# Patient Record
Sex: FEMALE | Race: WHITE | NOT HISPANIC OR LATINO | URBAN - METROPOLITAN AREA
[De-identification: names, ages, dates, MRNs, and addresses within clinical notes are randomized per-mention and may not be internally consistent; named-entity substitution may affect disease eponyms.]

---

## 2017-03-20 ENCOUNTER — OUTPATIENT (OUTPATIENT)
Dept: OUTPATIENT SERVICES | Facility: HOSPITAL | Age: 77
LOS: 1 days | End: 2017-03-20
Payer: MEDICARE

## 2017-03-20 VITALS
TEMPERATURE: 99 F | HEIGHT: 64 IN | DIASTOLIC BLOOD PRESSURE: 82 MMHG | WEIGHT: 177.91 LBS | RESPIRATION RATE: 16 BRPM | HEART RATE: 69 BPM | SYSTOLIC BLOOD PRESSURE: 152 MMHG

## 2017-03-20 DIAGNOSIS — Z98.891 HISTORY OF UTERINE SCAR FROM PREVIOUS SURGERY: Chronic | ICD-10-CM

## 2017-03-20 DIAGNOSIS — M17.11 UNILATERAL PRIMARY OSTEOARTHRITIS, RIGHT KNEE: ICD-10-CM

## 2017-03-20 DIAGNOSIS — Z01.818 ENCOUNTER FOR OTHER PREPROCEDURAL EXAMINATION: ICD-10-CM

## 2017-03-20 DIAGNOSIS — Z98.890 OTHER SPECIFIED POSTPROCEDURAL STATES: Chronic | ICD-10-CM

## 2017-03-20 DIAGNOSIS — Z96.7 PRESENCE OF OTHER BONE AND TENDON IMPLANTS: Chronic | ICD-10-CM

## 2017-03-20 DIAGNOSIS — Z41.9 ENCOUNTER FOR PROCEDURE FOR PURPOSES OTHER THAN REMEDYING HEALTH STATE, UNSPECIFIED: Chronic | ICD-10-CM

## 2017-03-20 LAB
ALBUMIN SERPL ELPH-MCNC: 3.8 G/DL — SIGNIFICANT CHANGE UP (ref 3.3–5)
ALP SERPL-CCNC: 67 U/L — SIGNIFICANT CHANGE UP (ref 40–120)
ALT FLD-CCNC: 20 U/L — SIGNIFICANT CHANGE UP (ref 12–78)
ANION GAP SERPL CALC-SCNC: 10 MMOL/L — SIGNIFICANT CHANGE UP (ref 5–17)
APTT BLD: 32.8 SEC — SIGNIFICANT CHANGE UP (ref 27.5–37.4)
AST SERPL-CCNC: 15 U/L — SIGNIFICANT CHANGE UP (ref 15–37)
BILIRUB SERPL-MCNC: 0.5 MG/DL — SIGNIFICANT CHANGE UP (ref 0.2–1.2)
BUN SERPL-MCNC: 20 MG/DL — SIGNIFICANT CHANGE UP (ref 7–23)
CALCIUM SERPL-MCNC: 9.3 MG/DL — SIGNIFICANT CHANGE UP (ref 8.5–10.1)
CHLORIDE SERPL-SCNC: 102 MMOL/L — SIGNIFICANT CHANGE UP (ref 96–108)
CO2 SERPL-SCNC: 27 MMOL/L — SIGNIFICANT CHANGE UP (ref 22–31)
CREAT SERPL-MCNC: 0.75 MG/DL — SIGNIFICANT CHANGE UP (ref 0.5–1.3)
GLUCOSE SERPL-MCNC: 88 MG/DL — SIGNIFICANT CHANGE UP (ref 70–99)
HBA1C BLD-MCNC: 5.7 % — HIGH (ref 4–5.6)
HCT VFR BLD CALC: 39.7 % — SIGNIFICANT CHANGE UP (ref 34.5–45)
HGB BLD-MCNC: 13.6 G/DL — SIGNIFICANT CHANGE UP (ref 11.5–15.5)
INR BLD: 1.02 RATIO — SIGNIFICANT CHANGE UP (ref 0.88–1.16)
MCHC RBC-ENTMCNC: 31.3 PG — SIGNIFICANT CHANGE UP (ref 27–34)
MCHC RBC-ENTMCNC: 34.4 GM/DL — SIGNIFICANT CHANGE UP (ref 32–36)
MCV RBC AUTO: 90.9 FL — SIGNIFICANT CHANGE UP (ref 80–100)
PLATELET # BLD AUTO: 246 K/UL — SIGNIFICANT CHANGE UP (ref 150–400)
POTASSIUM SERPL-MCNC: 4 MMOL/L — SIGNIFICANT CHANGE UP (ref 3.5–5.3)
POTASSIUM SERPL-SCNC: 4 MMOL/L — SIGNIFICANT CHANGE UP (ref 3.5–5.3)
PROT SERPL-MCNC: 7.7 G/DL — SIGNIFICANT CHANGE UP (ref 6–8.3)
PROTHROM AB SERPL-ACNC: 11.3 SEC — SIGNIFICANT CHANGE UP (ref 10–13.1)
RBC # BLD: 4.36 M/UL — SIGNIFICANT CHANGE UP (ref 3.8–5.2)
RBC # FLD: 13.2 % — SIGNIFICANT CHANGE UP (ref 10.3–14.5)
SODIUM SERPL-SCNC: 139 MMOL/L — SIGNIFICANT CHANGE UP (ref 135–145)
WBC # BLD: 9.3 K/UL — SIGNIFICANT CHANGE UP (ref 3.8–10.5)
WBC # FLD AUTO: 9.3 K/UL — SIGNIFICANT CHANGE UP (ref 3.8–10.5)

## 2017-03-20 PROCEDURE — 93010 ELECTROCARDIOGRAM REPORT: CPT | Mod: NC

## 2017-03-20 PROCEDURE — 71020: CPT | Mod: 26

## 2017-03-20 PROCEDURE — 73564 X-RAY EXAM KNEE 4 OR MORE: CPT | Mod: 26,50

## 2017-03-20 NOTE — H&P PST ADULT - PMH
Hypertension    Unilateral primary osteoarthritis, right knee Anxiety and depression    Bilateral sciatica    Glaucoma    Herniated lumbar intervertebral disc    Hypertension    Low back pain    Unilateral primary osteoarthritis, right knee

## 2017-03-20 NOTE — H&P PST ADULT - HISTORY OF PRESENT ILLNESS
76 year old female presents for PST prior to RIGHT Total Knee Replacement with Dr Lyle Khan on 3/27/17 - pt notes 76 year old female presents for PST prior to RIGHT Total Knee Replacement with Dr Lyle Khan on 3/27/17 - (Pt notes she is then scheduled for LEFT Knee replacement the following week) - pt notes h/o arthritis in both knees which has been bothering her for a long time - pt notes she has had physical therapy - also notes has had the Gel Injections which helped for a while then pain came back - pt notes she has difficulty going up and down stairs, uses cane for ambulation assistance - notes decreased range of motion in knees and diminished strentgh - denies recent swelling - notes occasional use of Ibuprofen for pain relief - notes pain right now ia #2-4/10 on pain scale (Notes it used to be 12) - following discussions with Dr Khan pt is electing for scheduled procedure.

## 2017-03-20 NOTE — H&P PST ADULT - RS GEN PE MLT RESP DETAILS PC
airway patent/respirations non-labored/breath sounds equal/clear to auscultation bilaterally/good air movement

## 2017-03-20 NOTE — H&P PST ADULT - PROBLEM SELECTOR PLAN 1
PST Labs; CBC, CMP, Coags, Type & Screen, Nose Culture for MRSA/MSSA, EKG - medical clearance needed - pre-op instructions as well as pre-op wash instructions given to pt with understanding verbalized PST Labs; CBC, CMP, Coags, Type & Screen, Nose Culture for MRSA/MSSA, EKG - medical clearance needed - pre-op instructions as well as pre-op wash instructions given to pt with understanding verbalized    3/21/17@1850, Positive nose culture for MSSA received, Escribe sent to pt's pharmacy for mupirocin ointment. Spoke with pt and reinforced pt to use nasal ointment BID for 5 days ASAP. Pt verbalized understanding. (Ady Gao, RANJAN)

## 2017-03-20 NOTE — H&P PST ADULT - ASSESSMENT
76 year old female with Unilateral Primary Osteoarthritis, RIGHT Knee - scheduled for RIGHT Total Knee Replacement with Dr Khan on 3/27/17 -

## 2017-03-20 NOTE — H&P PST ADULT - REASON FOR ADMISSION
Pt states " Pt states " I am having a RIGHT Knee Replacement....(pt notes she is then scheduled for left knee replacement week later)

## 2017-03-20 NOTE — H&P PST ADULT - NEGATIVE ENMT SYMPTOMS
no nasal obstruction/no nasal congestion/no hearing difficulty/no nasal discharge/no sinus symptoms/no vertigo

## 2017-03-20 NOTE — H&P PST ADULT - PSH
Elective surgery  Notes "Tubular Reconstruction to be able to get pregnant"  H/O colonoscopy    H/O lumpectomy  Bilateral Breast Lumpectomies - notes marker in RIGHT breast  S/P  section    S/P ORIF (open reduction internal fixation) fracture   Gennaro Ankle (thinks Left) notes screws and a plate in place

## 2017-03-20 NOTE — H&P PST ADULT - NSANTHOSAYNRD_GEN_A_CORE
No. DOLORES screening performed.  STOP BANG Legend: 0-2 = LOW Risk; 3-4 = INTERMEDIATE Risk; 5-8 = HIGH Risk

## 2017-03-21 LAB
MRSA PCR RESULT.: SIGNIFICANT CHANGE UP
S AUREUS DNA NOSE QL NAA+PROBE: DETECTED

## 2017-03-21 RX ORDER — MUPIROCIN 20 MG/G
1 OINTMENT TOPICAL
Qty: 1 | Refills: 0 | OUTPATIENT
Start: 2017-03-21 | End: 2017-03-26

## 2017-03-22 RX ORDER — SODIUM CHLORIDE 9 MG/ML
1000 INJECTION, SOLUTION INTRAVENOUS
Qty: 0 | Refills: 0 | Status: DISCONTINUED | OUTPATIENT
Start: 2017-03-27 | End: 2017-03-27

## 2017-03-25 PROCEDURE — 73564 X-RAY EXAM KNEE 4 OR MORE: CPT

## 2017-03-25 PROCEDURE — 87641 MR-STAPH DNA AMP PROBE: CPT

## 2017-03-25 PROCEDURE — 86901 BLOOD TYPING SEROLOGIC RH(D): CPT

## 2017-03-25 PROCEDURE — 83036 HEMOGLOBIN GLYCOSYLATED A1C: CPT

## 2017-03-25 PROCEDURE — 93005 ELECTROCARDIOGRAM TRACING: CPT

## 2017-03-25 PROCEDURE — 85610 PROTHROMBIN TIME: CPT

## 2017-03-25 PROCEDURE — 86920 COMPATIBILITY TEST SPIN: CPT

## 2017-03-25 PROCEDURE — 80053 COMPREHEN METABOLIC PANEL: CPT

## 2017-03-25 PROCEDURE — G0463: CPT

## 2017-03-25 PROCEDURE — 86850 RBC ANTIBODY SCREEN: CPT

## 2017-03-25 PROCEDURE — 85730 THROMBOPLASTIN TIME PARTIAL: CPT

## 2017-03-25 PROCEDURE — 86900 BLOOD TYPING SEROLOGIC ABO: CPT

## 2017-03-25 PROCEDURE — 87640 STAPH A DNA AMP PROBE: CPT

## 2017-03-25 PROCEDURE — 85027 COMPLETE CBC AUTOMATED: CPT

## 2017-03-25 PROCEDURE — 71046 X-RAY EXAM CHEST 2 VIEWS: CPT

## 2017-03-26 ENCOUNTER — TRANSCRIPTION ENCOUNTER (OUTPATIENT)
Age: 77
End: 2017-03-26

## 2017-03-27 ENCOUNTER — INPATIENT (INPATIENT)
Facility: HOSPITAL | Age: 77
LOS: 9 days | Discharge: NURSING, REHAB OR LONG-TERM CARE | DRG: 462 | End: 2017-04-06
Attending: ORTHOPAEDIC SURGERY | Admitting: ORTHOPAEDIC SURGERY
Payer: MEDICARE

## 2017-03-27 VITALS
RESPIRATION RATE: 12 BRPM | HEIGHT: 64 IN | SYSTOLIC BLOOD PRESSURE: 128 MMHG | DIASTOLIC BLOOD PRESSURE: 70 MMHG | TEMPERATURE: 99 F | WEIGHT: 175.05 LBS | OXYGEN SATURATION: 100 % | HEART RATE: 59 BPM

## 2017-03-27 DIAGNOSIS — Z98.890 OTHER SPECIFIED POSTPROCEDURAL STATES: Chronic | ICD-10-CM

## 2017-03-27 DIAGNOSIS — Z98.891 HISTORY OF UTERINE SCAR FROM PREVIOUS SURGERY: Chronic | ICD-10-CM

## 2017-03-27 DIAGNOSIS — Z96.7 PRESENCE OF OTHER BONE AND TENDON IMPLANTS: Chronic | ICD-10-CM

## 2017-03-27 DIAGNOSIS — M17.11 UNILATERAL PRIMARY OSTEOARTHRITIS, RIGHT KNEE: ICD-10-CM

## 2017-03-27 DIAGNOSIS — Z41.9 ENCOUNTER FOR PROCEDURE FOR PURPOSES OTHER THAN REMEDYING HEALTH STATE, UNSPECIFIED: Chronic | ICD-10-CM

## 2017-03-27 LAB
ABO RH CONFIRMATION: SIGNIFICANT CHANGE UP
HCT VFR BLD CALC: 35.9 % — SIGNIFICANT CHANGE UP (ref 34.5–45)
HGB BLD-MCNC: 11.8 G/DL — SIGNIFICANT CHANGE UP (ref 11.5–15.5)

## 2017-03-27 PROCEDURE — 73562 X-RAY EXAM OF KNEE 3: CPT | Mod: 26,RT

## 2017-03-27 PROCEDURE — 88311 DECALCIFY TISSUE: CPT | Mod: 26

## 2017-03-27 PROCEDURE — 88305 TISSUE EXAM BY PATHOLOGIST: CPT | Mod: 26

## 2017-03-27 RX ORDER — CEFAZOLIN SODIUM 1 G
2000 VIAL (EA) INJECTION ONCE
Qty: 0 | Refills: 0 | Status: COMPLETED | OUTPATIENT
Start: 2017-03-27 | End: 2017-03-27

## 2017-03-27 RX ORDER — CEFAZOLIN SODIUM 1 G
1000 VIAL (EA) INJECTION EVERY 6 HOURS
Qty: 0 | Refills: 0 | Status: COMPLETED | OUTPATIENT
Start: 2017-03-27 | End: 2017-03-28

## 2017-03-27 RX ORDER — FERROUS SULFATE 325(65) MG
325 TABLET ORAL
Qty: 0 | Refills: 0 | Status: DISCONTINUED | OUTPATIENT
Start: 2017-03-27 | End: 2017-04-03

## 2017-03-27 RX ORDER — DORZOLAMIDE HYDROCHLORIDE TIMOLOL MALEATE 20; 5 MG/ML; MG/ML
1 SOLUTION/ DROPS OPHTHALMIC
Qty: 0 | Refills: 0 | COMMUNITY

## 2017-03-27 RX ORDER — HYDROMORPHONE HYDROCHLORIDE 2 MG/ML
0.5 INJECTION INTRAMUSCULAR; INTRAVENOUS; SUBCUTANEOUS
Qty: 0 | Refills: 0 | Status: DISCONTINUED | OUTPATIENT
Start: 2017-03-27 | End: 2017-03-27

## 2017-03-27 RX ORDER — DORZOLAMIDE HYDROCHLORIDE TIMOLOL MALEATE 20; 5 MG/ML; MG/ML
1 SOLUTION/ DROPS OPHTHALMIC
Qty: 0 | Refills: 0 | Status: DISCONTINUED | OUTPATIENT
Start: 2017-03-27 | End: 2017-04-03

## 2017-03-27 RX ORDER — SODIUM CHLORIDE 9 MG/ML
1000 INJECTION, SOLUTION INTRAVENOUS
Qty: 0 | Refills: 0 | Status: DISCONTINUED | OUTPATIENT
Start: 2017-03-27 | End: 2017-03-29

## 2017-03-27 RX ORDER — ASCORBIC ACID 60 MG
500 TABLET,CHEWABLE ORAL
Qty: 0 | Refills: 0 | Status: DISCONTINUED | OUTPATIENT
Start: 2017-03-27 | End: 2017-04-03

## 2017-03-27 RX ORDER — CELECOXIB 200 MG/1
200 CAPSULE ORAL
Qty: 0 | Refills: 0 | Status: DISCONTINUED | OUTPATIENT
Start: 2017-03-27 | End: 2017-04-03

## 2017-03-27 RX ORDER — SERTRALINE 25 MG/1
50 TABLET, FILM COATED ORAL DAILY
Qty: 0 | Refills: 0 | Status: DISCONTINUED | OUTPATIENT
Start: 2017-03-27 | End: 2017-04-03

## 2017-03-27 RX ORDER — CHOLECALCIFEROL (VITAMIN D3) 125 MCG
1 CAPSULE ORAL
Qty: 0 | Refills: 0 | COMMUNITY

## 2017-03-27 RX ORDER — ONDANSETRON 8 MG/1
4 TABLET, FILM COATED ORAL EVERY 6 HOURS
Qty: 0 | Refills: 0 | Status: DISCONTINUED | OUTPATIENT
Start: 2017-03-27 | End: 2017-04-03

## 2017-03-27 RX ORDER — FOLIC ACID 0.8 MG
1 TABLET ORAL DAILY
Qty: 0 | Refills: 0 | Status: DISCONTINUED | OUTPATIENT
Start: 2017-03-27 | End: 2017-04-03

## 2017-03-27 RX ORDER — ACETAMINOPHEN 500 MG
1000 TABLET ORAL ONCE
Qty: 0 | Refills: 0 | Status: COMPLETED | OUTPATIENT
Start: 2017-03-27 | End: 2017-03-27

## 2017-03-27 RX ORDER — ACETAMINOPHEN 500 MG
650 TABLET ORAL EVERY 8 HOURS
Qty: 0 | Refills: 0 | Status: COMPLETED | OUTPATIENT
Start: 2017-03-29 | End: 2017-03-31

## 2017-03-27 RX ORDER — LATANOPROST 0.05 MG/ML
1 SOLUTION/ DROPS OPHTHALMIC; TOPICAL AT BEDTIME
Qty: 0 | Refills: 0 | Status: DISCONTINUED | OUTPATIENT
Start: 2017-03-27 | End: 2017-04-03

## 2017-03-27 RX ORDER — HYDROMORPHONE HYDROCHLORIDE 2 MG/ML
2 INJECTION INTRAMUSCULAR; INTRAVENOUS; SUBCUTANEOUS EVERY 4 HOURS
Qty: 0 | Refills: 0 | Status: DISCONTINUED | OUTPATIENT
Start: 2017-03-27 | End: 2017-04-03

## 2017-03-27 RX ORDER — ENOXAPARIN SODIUM 100 MG/ML
40 INJECTION SUBCUTANEOUS DAILY
Qty: 0 | Refills: 0 | Status: COMPLETED | OUTPATIENT
Start: 2017-03-28 | End: 2017-04-02

## 2017-03-27 RX ORDER — SERTRALINE 25 MG/1
1 TABLET, FILM COATED ORAL
Qty: 0 | Refills: 0 | COMMUNITY

## 2017-03-27 RX ORDER — TRIAMTERENE/HYDROCHLOROTHIAZID 75 MG-50MG
1 TABLET ORAL DAILY
Qty: 0 | Refills: 0 | Status: DISCONTINUED | OUTPATIENT
Start: 2017-03-27 | End: 2017-03-27

## 2017-03-27 RX ORDER — MORPHINE SULFATE 50 MG/1
2 CAPSULE, EXTENDED RELEASE ORAL EVERY 4 HOURS
Qty: 0 | Refills: 0 | Status: DISCONTINUED | OUTPATIENT
Start: 2017-03-27 | End: 2017-04-03

## 2017-03-27 RX ORDER — SODIUM CHLORIDE 9 MG/ML
1000 INJECTION, SOLUTION INTRAVENOUS
Qty: 0 | Refills: 0 | Status: DISCONTINUED | OUTPATIENT
Start: 2017-03-27 | End: 2017-03-27

## 2017-03-27 RX ORDER — DOCUSATE SODIUM 100 MG
100 CAPSULE ORAL THREE TIMES A DAY
Qty: 0 | Refills: 0 | Status: DISCONTINUED | OUTPATIENT
Start: 2017-03-27 | End: 2017-04-03

## 2017-03-27 RX ORDER — BUPIVACAINE 13.3 MG/ML
20 INJECTION, SUSPENSION, LIPOSOMAL INFILTRATION ONCE
Qty: 0 | Refills: 0 | Status: COMPLETED | OUTPATIENT
Start: 2017-03-27 | End: 2017-03-27

## 2017-03-27 RX ORDER — BIMATOPROST 0.3 MG/ML
1 SOLUTION/ DROPS OPHTHALMIC
Qty: 0 | Refills: 0 | COMMUNITY

## 2017-03-27 RX ORDER — ONDANSETRON 8 MG/1
4 TABLET, FILM COATED ORAL ONCE
Qty: 0 | Refills: 0 | Status: DISCONTINUED | OUTPATIENT
Start: 2017-03-27 | End: 2017-03-27

## 2017-03-27 RX ORDER — OXYCODONE HYDROCHLORIDE 5 MG/1
10 TABLET ORAL EVERY 6 HOURS
Qty: 0 | Refills: 0 | Status: DISCONTINUED | OUTPATIENT
Start: 2017-03-27 | End: 2017-03-27

## 2017-03-27 RX ORDER — HYDROMORPHONE HYDROCHLORIDE 2 MG/ML
4 INJECTION INTRAMUSCULAR; INTRAVENOUS; SUBCUTANEOUS EVERY 4 HOURS
Qty: 0 | Refills: 0 | Status: DISCONTINUED | OUTPATIENT
Start: 2017-03-27 | End: 2017-04-03

## 2017-03-27 RX ADMIN — CELECOXIB 200 MILLIGRAM(S): 200 CAPSULE ORAL at 17:43

## 2017-03-27 RX ADMIN — Medication 100 MILLIGRAM(S): at 22:24

## 2017-03-27 RX ADMIN — MORPHINE SULFATE 2 MILLIGRAM(S): 50 CAPSULE, EXTENDED RELEASE ORAL at 22:00

## 2017-03-27 RX ADMIN — HYDROMORPHONE HYDROCHLORIDE 2 MILLIGRAM(S): 2 INJECTION INTRAMUSCULAR; INTRAVENOUS; SUBCUTANEOUS at 22:08

## 2017-03-27 RX ADMIN — Medication 100 MILLIGRAM(S): at 16:27

## 2017-03-27 RX ADMIN — CELECOXIB 200 MILLIGRAM(S): 200 CAPSULE ORAL at 17:37

## 2017-03-27 RX ADMIN — Medication 1000 MILLIGRAM(S): at 17:43

## 2017-03-27 RX ADMIN — HYDROMORPHONE HYDROCHLORIDE 2 MILLIGRAM(S): 2 INJECTION INTRAMUSCULAR; INTRAVENOUS; SUBCUTANEOUS at 21:36

## 2017-03-27 RX ADMIN — HYDROMORPHONE HYDROCHLORIDE 0.5 MILLIGRAM(S): 2 INJECTION INTRAMUSCULAR; INTRAVENOUS; SUBCUTANEOUS at 13:17

## 2017-03-27 RX ADMIN — DORZOLAMIDE HYDROCHLORIDE TIMOLOL MALEATE 1 DROP(S): 20; 5 SOLUTION/ DROPS OPHTHALMIC at 17:37

## 2017-03-27 RX ADMIN — LATANOPROST 1 DROP(S): 0.05 SOLUTION/ DROPS OPHTHALMIC; TOPICAL at 21:29

## 2017-03-27 RX ADMIN — Medication 500 MILLIGRAM(S): at 17:37

## 2017-03-27 RX ADMIN — Medication 325 MILLIGRAM(S): at 17:37

## 2017-03-27 RX ADMIN — SODIUM CHLORIDE 75 MILLILITER(S): 9 INJECTION, SOLUTION INTRAVENOUS at 13:07

## 2017-03-27 RX ADMIN — Medication 400 MILLIGRAM(S): at 17:37

## 2017-03-27 RX ADMIN — HYDROMORPHONE HYDROCHLORIDE 0.5 MILLIGRAM(S): 2 INJECTION INTRAMUSCULAR; INTRAVENOUS; SUBCUTANEOUS at 13:23

## 2017-03-27 RX ADMIN — SODIUM CHLORIDE 75 MILLILITER(S): 9 INJECTION, SOLUTION INTRAVENOUS at 08:13

## 2017-03-27 RX ADMIN — Medication 100 MILLIGRAM(S): at 21:29

## 2017-03-27 RX ADMIN — HYDROMORPHONE HYDROCHLORIDE 0.5 MILLIGRAM(S): 2 INJECTION INTRAMUSCULAR; INTRAVENOUS; SUBCUTANEOUS at 13:07

## 2017-03-27 NOTE — PATIENT PROFILE ADULT. - PMH
Anxiety and depression    Bilateral sciatica    Glaucoma    Herniated lumbar intervertebral disc    Hypertension    Low back pain    Unilateral primary osteoarthritis, right knee

## 2017-03-27 NOTE — PATIENT PROFILE ADULT. - FAMILY HISTORY
Mother  Still living? No  Family history of breast cancer in mother, Age at diagnosis: Age Unknown  Hypertension, Age at diagnosis: Age Unknown     Father  Still living? No  Family history of stroke, Age at diagnosis: Age Unknown

## 2017-03-27 NOTE — PATIENT PROFILE ADULT. - REASON FOR ADMISSION
Pt states " I am having a RIGHT Knee Replacement....(pt notes she is then scheduled for left knee replacement week later)

## 2017-03-27 NOTE — PATIENT PROFILE ADULT. - VISION (WITH CORRECTIVE LENSES IF THE PATIENT USUALLY WEARS THEM):
Wears RX Eyeglasses/Partially impaired: cannot see medication labels or newsprint, but can see obstacles in path, and the surrounding layout; can count fingers at arm's length

## 2017-03-28 LAB
ANION GAP SERPL CALC-SCNC: 9 MMOL/L — SIGNIFICANT CHANGE UP (ref 5–17)
BUN SERPL-MCNC: 17 MG/DL — SIGNIFICANT CHANGE UP (ref 7–23)
CALCIUM SERPL-MCNC: 9.1 MG/DL — SIGNIFICANT CHANGE UP (ref 8.5–10.1)
CHLORIDE SERPL-SCNC: 102 MMOL/L — SIGNIFICANT CHANGE UP (ref 96–108)
CO2 SERPL-SCNC: 30 MMOL/L — SIGNIFICANT CHANGE UP (ref 22–31)
CREAT SERPL-MCNC: 0.88 MG/DL — SIGNIFICANT CHANGE UP (ref 0.5–1.3)
GLUCOSE SERPL-MCNC: 114 MG/DL — HIGH (ref 70–99)
HCT VFR BLD CALC: 34.8 % — SIGNIFICANT CHANGE UP (ref 34.5–45)
HGB BLD-MCNC: 11.4 G/DL — LOW (ref 11.5–15.5)
POTASSIUM SERPL-MCNC: 3.5 MMOL/L — SIGNIFICANT CHANGE UP (ref 3.5–5.3)
POTASSIUM SERPL-SCNC: 3.5 MMOL/L — SIGNIFICANT CHANGE UP (ref 3.5–5.3)
SODIUM SERPL-SCNC: 141 MMOL/L — SIGNIFICANT CHANGE UP (ref 135–145)

## 2017-03-28 RX ADMIN — LATANOPROST 1 DROP(S): 0.05 SOLUTION/ DROPS OPHTHALMIC; TOPICAL at 21:41

## 2017-03-28 RX ADMIN — Medication 100 MILLIGRAM(S): at 05:20

## 2017-03-28 RX ADMIN — HYDROMORPHONE HYDROCHLORIDE 4 MILLIGRAM(S): 2 INJECTION INTRAMUSCULAR; INTRAVENOUS; SUBCUTANEOUS at 15:31

## 2017-03-28 RX ADMIN — ENOXAPARIN SODIUM 40 MILLIGRAM(S): 100 INJECTION SUBCUTANEOUS at 12:53

## 2017-03-28 RX ADMIN — Medication 325 MILLIGRAM(S): at 09:14

## 2017-03-28 RX ADMIN — MORPHINE SULFATE 2 MILLIGRAM(S): 50 CAPSULE, EXTENDED RELEASE ORAL at 21:42

## 2017-03-28 RX ADMIN — HYDROMORPHONE HYDROCHLORIDE 4 MILLIGRAM(S): 2 INJECTION INTRAMUSCULAR; INTRAVENOUS; SUBCUTANEOUS at 11:01

## 2017-03-28 RX ADMIN — CELECOXIB 200 MILLIGRAM(S): 200 CAPSULE ORAL at 09:14

## 2017-03-28 RX ADMIN — Medication 100 MILLIGRAM(S): at 15:01

## 2017-03-28 RX ADMIN — Medication 325 MILLIGRAM(S): at 12:53

## 2017-03-28 RX ADMIN — CELECOXIB 200 MILLIGRAM(S): 200 CAPSULE ORAL at 18:19

## 2017-03-28 RX ADMIN — HYDROMORPHONE HYDROCHLORIDE 4 MILLIGRAM(S): 2 INJECTION INTRAMUSCULAR; INTRAVENOUS; SUBCUTANEOUS at 10:31

## 2017-03-28 RX ADMIN — HYDROMORPHONE HYDROCHLORIDE 2 MILLIGRAM(S): 2 INJECTION INTRAMUSCULAR; INTRAVENOUS; SUBCUTANEOUS at 04:44

## 2017-03-28 RX ADMIN — DORZOLAMIDE HYDROCHLORIDE TIMOLOL MALEATE 1 DROP(S): 20; 5 SOLUTION/ DROPS OPHTHALMIC at 18:19

## 2017-03-28 RX ADMIN — CELECOXIB 200 MILLIGRAM(S): 200 CAPSULE ORAL at 18:24

## 2017-03-28 RX ADMIN — Medication 500 MILLIGRAM(S): at 05:20

## 2017-03-28 RX ADMIN — HYDROMORPHONE HYDROCHLORIDE 4 MILLIGRAM(S): 2 INJECTION INTRAMUSCULAR; INTRAVENOUS; SUBCUTANEOUS at 19:04

## 2017-03-28 RX ADMIN — Medication 100 MILLIGRAM(S): at 21:42

## 2017-03-28 RX ADMIN — HYDROMORPHONE HYDROCHLORIDE 4 MILLIGRAM(S): 2 INJECTION INTRAMUSCULAR; INTRAVENOUS; SUBCUTANEOUS at 19:25

## 2017-03-28 RX ADMIN — Medication 1 TABLET(S): at 12:53

## 2017-03-28 RX ADMIN — HYDROMORPHONE HYDROCHLORIDE 4 MILLIGRAM(S): 2 INJECTION INTRAMUSCULAR; INTRAVENOUS; SUBCUTANEOUS at 15:01

## 2017-03-28 RX ADMIN — Medication 325 MILLIGRAM(S): at 18:19

## 2017-03-28 RX ADMIN — Medication 100 MILLIGRAM(S): at 04:13

## 2017-03-28 RX ADMIN — DORZOLAMIDE HYDROCHLORIDE TIMOLOL MALEATE 1 DROP(S): 20; 5 SOLUTION/ DROPS OPHTHALMIC at 05:20

## 2017-03-28 RX ADMIN — SERTRALINE 50 MILLIGRAM(S): 25 TABLET, FILM COATED ORAL at 21:42

## 2017-03-28 RX ADMIN — Medication 1 MILLIGRAM(S): at 12:53

## 2017-03-28 RX ADMIN — Medication 500 MILLIGRAM(S): at 18:19

## 2017-03-28 RX ADMIN — CELECOXIB 200 MILLIGRAM(S): 200 CAPSULE ORAL at 09:44

## 2017-03-29 DIAGNOSIS — F41.9 ANXIETY DISORDER, UNSPECIFIED: ICD-10-CM

## 2017-03-29 DIAGNOSIS — I95.9 HYPOTENSION, UNSPECIFIED: ICD-10-CM

## 2017-03-29 DIAGNOSIS — D50.0 IRON DEFICIENCY ANEMIA SECONDARY TO BLOOD LOSS (CHRONIC): ICD-10-CM

## 2017-03-29 DIAGNOSIS — Z71.89 OTHER SPECIFIED COUNSELING: ICD-10-CM

## 2017-03-29 LAB
HCT VFR BLD CALC: 33.1 % — LOW (ref 34.5–45)
HGB BLD-MCNC: 10.6 G/DL — LOW (ref 11.5–15.5)
SURGICAL PATHOLOGY FINAL REPORT - CH: SIGNIFICANT CHANGE UP

## 2017-03-29 RX ADMIN — SERTRALINE 50 MILLIGRAM(S): 25 TABLET, FILM COATED ORAL at 12:13

## 2017-03-29 RX ADMIN — Medication 650 MILLIGRAM(S): at 05:54

## 2017-03-29 RX ADMIN — Medication 500 MILLIGRAM(S): at 18:05

## 2017-03-29 RX ADMIN — Medication 1 MILLIGRAM(S): at 12:13

## 2017-03-29 RX ADMIN — LATANOPROST 1 DROP(S): 0.05 SOLUTION/ DROPS OPHTHALMIC; TOPICAL at 21:19

## 2017-03-29 RX ADMIN — MORPHINE SULFATE 2 MILLIGRAM(S): 50 CAPSULE, EXTENDED RELEASE ORAL at 02:12

## 2017-03-29 RX ADMIN — Medication 500 MILLIGRAM(S): at 20:19

## 2017-03-29 RX ADMIN — HYDROMORPHONE HYDROCHLORIDE 4 MILLIGRAM(S): 2 INJECTION INTRAMUSCULAR; INTRAVENOUS; SUBCUTANEOUS at 05:24

## 2017-03-29 RX ADMIN — CELECOXIB 200 MILLIGRAM(S): 200 CAPSULE ORAL at 18:04

## 2017-03-29 RX ADMIN — CELECOXIB 200 MILLIGRAM(S): 200 CAPSULE ORAL at 08:56

## 2017-03-29 RX ADMIN — Medication 1 TABLET(S): at 12:13

## 2017-03-29 RX ADMIN — Medication 650 MILLIGRAM(S): at 05:24

## 2017-03-29 RX ADMIN — MORPHINE SULFATE 2 MILLIGRAM(S): 50 CAPSULE, EXTENDED RELEASE ORAL at 09:47

## 2017-03-29 RX ADMIN — MORPHINE SULFATE 2 MILLIGRAM(S): 50 CAPSULE, EXTENDED RELEASE ORAL at 01:42

## 2017-03-29 RX ADMIN — Medication 650 MILLIGRAM(S): at 21:19

## 2017-03-29 RX ADMIN — ENOXAPARIN SODIUM 40 MILLIGRAM(S): 100 INJECTION SUBCUTANEOUS at 12:13

## 2017-03-29 RX ADMIN — Medication 650 MILLIGRAM(S): at 13:08

## 2017-03-29 RX ADMIN — Medication 100 MILLIGRAM(S): at 21:19

## 2017-03-29 RX ADMIN — Medication 100 MILLIGRAM(S): at 13:08

## 2017-03-29 RX ADMIN — Medication 100 MILLIGRAM(S): at 05:24

## 2017-03-29 RX ADMIN — Medication 325 MILLIGRAM(S): at 18:07

## 2017-03-29 RX ADMIN — HYDROMORPHONE HYDROCHLORIDE 4 MILLIGRAM(S): 2 INJECTION INTRAMUSCULAR; INTRAVENOUS; SUBCUTANEOUS at 05:50

## 2017-03-29 RX ADMIN — DORZOLAMIDE HYDROCHLORIDE TIMOLOL MALEATE 1 DROP(S): 20; 5 SOLUTION/ DROPS OPHTHALMIC at 05:23

## 2017-03-29 RX ADMIN — DORZOLAMIDE HYDROCHLORIDE TIMOLOL MALEATE 1 DROP(S): 20; 5 SOLUTION/ DROPS OPHTHALMIC at 18:05

## 2017-03-29 NOTE — PROGRESS NOTE ADULT - SUBJECTIVE AND OBJECTIVE BOX
Patient is a 76y old  Female who presents with a chief complaint of Pt states " I am having a RIGHT Knee Replacement....(pt notes she is then scheduled for left knee replacement week later) (27 Mar 2017 07:36)      INTERVAL HPI/OVERNIGHT EVENTS: no complaints    MEDICATIONS  (STANDING):  acetaminophen   Tablet. 650milliGRAM(s) Oral every 8 hours  celecoxib 200milliGRAM(s) Oral two times a day after meals  docusate sodium 100milliGRAM(s) Oral three times a day  ferrous    sulfate 325milliGRAM(s) Oral three times a day with meals  folic acid 1milliGRAM(s) Oral daily  multivitamin 1Tablet(s) Oral daily  ascorbic acid 500milliGRAM(s) Oral two times a day  enoxaparin Injectable 40milliGRAM(s) SubCutaneous daily  sertraline 50milliGRAM(s) Oral daily  dorzolamide 2%/timolol 0.5% Ophthalmic Solution 1Drop(s) Both EYES two times a day  latanoprost 0.005% Ophthalmic Solution 1Drop(s) Both EYES at bedtime    MEDICATIONS  (PRN):  ondansetron Injectable 4milliGRAM(s) IV Push every 6 hours PRN Nausea and/or Vomiting  HYDROmorphone   Tablet 2milliGRAM(s) Oral every 4 hours PRN Moderate Pain (4 - 6)  HYDROmorphone   Tablet 4milliGRAM(s) Oral every 4 hours PRN Severe Pain (7 - 10)  morphine  - Injectable 2milliGRAM(s) IV Push every 4 hours PRN Severe Pain (7 - 10)      Allergies    latex (Rash)  No Known Drug Allergies    Intolerances        REVIEW OF SYSTEMS:  CONSTITUTIONAL: No fever, No chills,No fatigue,No myalgia,No Body ache  EYES: No eye pain, visual disturbances, or discharge  ENMT:  No ear pain, No nose bleed, No vertigo; No sinus or throat pain  NECK: No pain, No stiffness  RESPIRATORY: No cough, wheezing, No  hemoptysis; No shortness of breath  CARDIOVASCULAR: No chest pain, palpitations, leg swelling  GASTROINTESTINAL: No abdominal or epigastric pain. No nausea, No vomiting; No diarrhea or constipation. [ ] BM  GENITOURINARY: No dysuria, No frequency, No urgency, No hematuria, or incontinence  NEUROLOGICAL: alert and oriented x 3,  No headaches, No dizziness, No numbness,  SKIN:   No itching, burning, rashes, or lesions   MUSCULOSKELETAL: No joint pain or swelling; No muscle pain, No back pain, No extremity pain  PSYCHIATRIC: No depression, anxiety, mood swings, or difficulty sleeping  ROS  [ ] Unable to obtain   REST OF REVIEW Of SYSTEM - [ ] Normal     Height (cm): 162.6 (03-27 @ 07:53), 162.6 (03-20 @ 14:14)  Weight (kg): 79.4 (03-27 @ 07:53), 80.7 (03-20 @ 14:14)  BMI (kg/m2): 30 (03-27 @ 07:53), 30.5 (03-20 @ 14:14)  BSA (m2): 1.85 (03-27 @ 07:53), 1.86 (03-20 @ 14:14)  Vital Signs Last 24 Hrs  T(C): 36.7, Max: 37.1 (03-28 @ 21:05)  T(F): 98, Max: 98.8 (03-28 @ 21:05)  HR: 68 (62 - 70)  BP: 106/60 (95/61 - 118/70)  BP(mean): --  RR: 16 (16 - 16)  SpO2: 99% (95% - 99%)  [ ] room air   [ ] 02    PHYSICAL EXAM:  GENERAL:  No acute distresss, well appearing, [ ] Agitated, [ ] Lethargy, [ ] confused   HEAD:  normal  ENMT: normal  NECK:  normal    NERVOUS SYSTEM:  Alert & Oriented X3, no focal deficits [ ]Confusion  [ ] Encephalopathic [ ] Sedated [ ] Other  CHEST/LUNG: Clear to auscultation bilaterally,  [ ] decreased breath sounds at bases  [ ] wheezing   [ ] rhonchi  [ ] crackles  HEART:  Regular rate and rhythm, No murmurs, rubs, or gallops,  [ ] irregular   ABDOMEN:  soft, nontender, nondistended, positive bowel sounds   [ ] obese  EXTREMITIES: No clubbing, cyanosis or edema  SKIN: [ ] venous stasis skin changes    LABS:                        10.6   x     )-----------( x        ( 29 Mar 2017 08:01 )             33.1       Ca    9.1        28 Mar 2017 08:45        Hemoglobin A1C, Whole Blood: 5.7 % (03-20 @ 20:45)      CAPILLARY BLOOD GLUCOSE    Cultures          RADIOLOGY & ADDITIONAL TESTS:      Care Discussed with [X] Consultants  [ ] Patient  [ ] Family  [X]   /   [ ] Other; RN  DVT prophylaxis [ ] lovenox   [ ] subq heparin  [ ] coumadin  [ ] venodynes [ ] ambulating frequently at how risk for vte and no pharm         or  mechanical prophylaxis required    [ ] other   Advanced directive:    [ ]pt has hcp     [ ] pt declined to assign hcp  Discussed with pt @ bedside Patient is a 76y old  Female who presents with a chief complaint of Pt states " I am having a RIGHT Knee Replacement....(pt notes she is then scheduled for left knee replacement week later) (27 Mar 2017 07:36)      INTERVAL HPI/OVERNIGHT EVENTS: no complaints    MEDICATIONS  (STANDING):  acetaminophen   Tablet. 650milliGRAM(s) Oral every 8 hours  celecoxib 200milliGRAM(s) Oral two times a day after meals  docusate sodium 100milliGRAM(s) Oral three times a day  ferrous    sulfate 325milliGRAM(s) Oral three times a day with meals  folic acid 1milliGRAM(s) Oral daily  multivitamin 1Tablet(s) Oral daily  ascorbic acid 500milliGRAM(s) Oral two times a day  enoxaparin Injectable 40milliGRAM(s) SubCutaneous daily  sertraline 50milliGRAM(s) Oral daily  dorzolamide 2%/timolol 0.5% Ophthalmic Solution 1Drop(s) Both EYES two times a day  latanoprost 0.005% Ophthalmic Solution 1Drop(s) Both EYES at bedtime    MEDICATIONS  (PRN):  ondansetron Injectable 4milliGRAM(s) IV Push every 6 hours PRN Nausea and/or Vomiting  HYDROmorphone   Tablet 2milliGRAM(s) Oral every 4 hours PRN Moderate Pain (4 - 6)  HYDROmorphone   Tablet 4milliGRAM(s) Oral every 4 hours PRN Severe Pain (7 - 10)  morphine  - Injectable 2milliGRAM(s) IV Push every 4 hours PRN Severe Pain (7 - 10)      Allergies    latex (Rash)        REVIEW OF SYSTEMS:  CONSTITUTIONAL: No fever, No chills,No fatigue,No myalgia,No Body ache  EYES: No eye pain, visual disturbances, or discharge  ENMT:  No ear pain, No nose bleed, No vertigo; No sinus or throat pain  NECK: No pain, No stiffness  RESPIRATORY: No cough, wheezing, No  hemoptysis; No shortness of breath  CARDIOVASCULAR: No chest pain, palpitations, leg swelling  GASTROINTESTINAL: No abdominal or epigastric pain. No nausea, No vomiting; No diarrhea or constipation. [ ] BM  GENITOURINARY: No dysuria, No frequency, No urgency, No hematuria, or incontinence  NEUROLOGICAL: alert and oriented x 3,  No headaches, No dizziness, No numbness,  SKIN:   No itching, burning, rashes, or lesions   MUSCULOSKELETAL: No joint pain or swelling; No muscle pain, No back pain, No extremity pain  PSYCHIATRIC: No depression, anxiety, mood swings, or difficulty sleeping  ROS  [ ] Unable to obtain   REST OF REVIEW Of SYSTEM - [x ] Normal     Height (cm): 162.6 (03-27 @ 07:53), 162.6 (03-20 @ 14:14)  Weight (kg): 79.4 (03-27 @ 07:53), 80.7 (03-20 @ 14:14)  BMI (kg/m2): 30 (03-27 @ 07:53), 30.5 (03-20 @ 14:14)  BSA (m2): 1.85 (03-27 @ 07:53), 1.86 (03-20 @ 14:14)  Vital Signs Last 24 Hrs  T(C): 36.7, Max: 37.1 (03-28 @ 21:05)  T(F): 98, Max: 98.8 (03-28 @ 21:05)  HR: 68 (62 - 70)  BP: 106/60 (95/61 - 118/70)  BP(mean): --  RR: 16 (16 - 16)  SpO2: 99% (95% - 99%)  [ ] room air   [ ] 02    PHYSICAL EXAM:  GENERAL:  No acute distresss, well appearing, [ ] Agitated, [ ] Lethargy, [ ] confused   HEAD:  normal  ENMT: normal  NECK:  normal    NERVOUS SYSTEM:  Alert & Oriented X3, no focal deficits [ ]Confusion  [ ] Encephalopathic [ ] Sedated [ ] Other  CHEST/LUNG: Clear to auscultation bilaterally,  [ ] decreased breath sounds at bases  [ ] wheezing   [ ] rhonchi  [ ] crackles  HEART:  Regular rate and rhythm, No murmurs, rubs, or gallops,  [ ] irregular   ABDOMEN:  soft, nontender, nondistended, positive bowel sounds   [ ] obese  EXTREMITIES: No clubbing, cyanosis or edema  SKIN: [ ] venous stasis skin changes    LABS:                        10.6   x     )-----------( x        ( 29 Mar 2017 08:01 )             33.1       Ca    9.1        28 Mar 2017 08:45        Hemoglobin A1C, Whole Blood: 5.7 % (03-20 @ 20:45)      CAPILLARY BLOOD GLUCOSE    Cultures          RADIOLOGY & ADDITIONAL TESTS:      Care Discussed with [X] Consultants  [ ] Patient  [ ] Family  [X]   /   [ ] Other; RN  DVT prophylaxis [ ] lovenox   [ ] subq heparin  [ ] coumadin  [ ] venodynes [ ] ambulating frequently at how risk for vte and no pharm         or  mechanical prophylaxis required    [ ] other   Advanced directive:    [ ]pt has hcp     [ ] pt declined to assign hcp  Discussed with pt @ bedside

## 2017-03-30 DIAGNOSIS — I10 ESSENTIAL (PRIMARY) HYPERTENSION: ICD-10-CM

## 2017-03-30 RX ADMIN — Medication 1 MILLIGRAM(S): at 11:26

## 2017-03-30 RX ADMIN — Medication 325 MILLIGRAM(S): at 08:17

## 2017-03-30 RX ADMIN — CELECOXIB 200 MILLIGRAM(S): 200 CAPSULE ORAL at 17:46

## 2017-03-30 RX ADMIN — HYDROMORPHONE HYDROCHLORIDE 4 MILLIGRAM(S): 2 INJECTION INTRAMUSCULAR; INTRAVENOUS; SUBCUTANEOUS at 09:29

## 2017-03-30 RX ADMIN — Medication 650 MILLIGRAM(S): at 23:59

## 2017-03-30 RX ADMIN — Medication 1 TABLET(S): at 11:26

## 2017-03-30 RX ADMIN — Medication 650 MILLIGRAM(S): at 05:23

## 2017-03-30 RX ADMIN — Medication 100 MILLIGRAM(S): at 13:27

## 2017-03-30 RX ADMIN — Medication 650 MILLIGRAM(S): at 22:59

## 2017-03-30 RX ADMIN — Medication 325 MILLIGRAM(S): at 17:46

## 2017-03-30 RX ADMIN — Medication 500 MILLIGRAM(S): at 05:23

## 2017-03-30 RX ADMIN — SERTRALINE 50 MILLIGRAM(S): 25 TABLET, FILM COATED ORAL at 11:26

## 2017-03-30 RX ADMIN — ENOXAPARIN SODIUM 40 MILLIGRAM(S): 100 INJECTION SUBCUTANEOUS at 11:26

## 2017-03-30 RX ADMIN — CELECOXIB 200 MILLIGRAM(S): 200 CAPSULE ORAL at 08:17

## 2017-03-30 RX ADMIN — LATANOPROST 1 DROP(S): 0.05 SOLUTION/ DROPS OPHTHALMIC; TOPICAL at 22:59

## 2017-03-30 RX ADMIN — DORZOLAMIDE HYDROCHLORIDE TIMOLOL MALEATE 1 DROP(S): 20; 5 SOLUTION/ DROPS OPHTHALMIC at 17:46

## 2017-03-30 RX ADMIN — Medication 650 MILLIGRAM(S): at 13:27

## 2017-03-30 RX ADMIN — Medication 100 MILLIGRAM(S): at 22:59

## 2017-03-30 RX ADMIN — Medication 100 MILLIGRAM(S): at 05:23

## 2017-03-30 RX ADMIN — Medication 325 MILLIGRAM(S): at 11:56

## 2017-03-30 RX ADMIN — Medication 500 MILLIGRAM(S): at 17:46

## 2017-03-30 RX ADMIN — DORZOLAMIDE HYDROCHLORIDE TIMOLOL MALEATE 1 DROP(S): 20; 5 SOLUTION/ DROPS OPHTHALMIC at 05:23

## 2017-03-30 NOTE — PROGRESS NOTE ADULT - SUBJECTIVE AND OBJECTIVE BOX
Patient is a 76y old  Female who presents with a chief complaint of Pt states " I am having a RIGHT Knee Replacement....(pt notes she is then scheduled for left knee replacement week later) (27 Mar 2017 07:36)      INTERVAL HPI/OVERNIGHT EVENTS:  T(C): 36.4, Max: 36.9 (03-29 @ 21:10)  HR: 62 (62 - 71)  BP: 121/78 (94/60 - 121/78)  RR: 16 (16 - 16)  SpO2: 100% (97% - 100%)  Wt(kg): --  I&O's Summary      LABS:                        10.6   x     )-----------( x        ( 29 Mar 2017 08:01 )             33.1          MEDICATIONS  (STANDING):  acetaminophen   Tablet. 650milliGRAM(s) Oral every 8 hours  celecoxib 200milliGRAM(s) Oral two times a day after meals  docusate sodium 100milliGRAM(s) Oral three times a day  ferrous    sulfate 325milliGRAM(s) Oral three times a day with meals  folic acid 1milliGRAM(s) Oral daily  multivitamin 1Tablet(s) Oral daily  ascorbic acid 500milliGRAM(s) Oral two times a day  enoxaparin Injectable 40milliGRAM(s) SubCutaneous daily  sertraline 50milliGRAM(s) Oral daily  dorzolamide 2%/timolol 0.5% Ophthalmic Solution 1Drop(s) Both EYES two times a day  latanoprost 0.005% Ophthalmic Solution 1Drop(s) Both EYES at bedtime    MEDICATIONS  (PRN):  ondansetron Injectable 4milliGRAM(s) IV Push every 6 hours PRN Nausea and/or Vomiting  HYDROmorphone   Tablet 2milliGRAM(s) Oral every 4 hours PRN Moderate Pain (4 - 6)  HYDROmorphone   Tablet 4milliGRAM(s) Oral every 4 hours PRN Severe Pain (7 - 10)  morphine  - Injectable 2milliGRAM(s) IV Push every 4 hours PRN Severe Pain (7 - 10)      REVIEW OF SYSTEMS:  CONSTITUTIONAL: No fever, weight loss, or fatigue  EYES: No eye pain, visual disturbances, or discharge  ENMT:  No difficulty hearing, tinnitus, vertigo; No sinus or throat pain  NECK: No pain or stiffness  RESPIRATORY: No cough, wheezing, chills or hemoptysis; No shortness of breath  CARDIOVASCULAR: No chest pain, palpitations, dizziness, or leg swelling  GASTROINTESTINAL: No abdominal or epigastric pain. No nausea, vomiting, or hematemesis; No diarrhea or constipation. No melena or hematochezia.  GENITOURINARY: No dysuria, frequency, hematuria, or incontinence  NEUROLOGICAL: No headaches, memory loss, loss of strength, numbness, or tremors  SKIN: No itching, burning, rashes, or lesions   LYMPH NODES: No enlarged glands  ENDOCRINE: No heat or cold intolerance; No hair loss  MUSCULOSKELETAL: No joint pain or swelling; No muscle, back, or extremity pain  PSYCHIATRIC: No depression, anxiety, mood swings, or difficulty sleeping  HEME/LYMPH: No easy bruising, or bleeding gums  ALLERY AND IMMUNOLOGIC: No hives or eczema    RADIOLOGY & ADDITIONAL TESTS:    Imaging Personally Reviewed:  [X ] YES  [ ] NO    Consultant(s) Notes Reviewed:  [X ] YES  [ ] NO    PHYSICAL EXAM:  GENERAL: NAD, well-groomed, well-developed  HEAD:  Atraumatic, Normocephalic  EYES: EOMI, PERRLA, conjunctiva and sclera clear  ENMT: No tonsillar erythema, exudates, or enlargement; Moist mucous membranes, Good dentition, No lesions  NECK: Supple, No JVD, Normal thyroid  NERVOUS SYSTEM:  Alert & Oriented X3, Good concentration; Motor Strength 5/5 B/L upper and lower extremities; DTRs 2+ intact and symmetric  CHEST/LUNG: Clear to percussion bilaterally; No rales, rhonchi, wheezing, or rubs  HEART: Regular rate and rhythm; No murmurs, rubs, or gallops  ABDOMEN: Soft, Nontender, Nondistended; Bowel sounds present  EXTREMITIES:  2+ Peripheral Pulses, No clubbing, cyanosis, or edema  LYMPH: No lymphadenopathy noted  SKIN: No rashes or lesions    Care Discussed with Consultants/Other Providers [ ] YES  [ ] NO

## 2017-03-30 NOTE — PROGRESS NOTE ADULT - PROBLEM SELECTOR PLAN 2
pts bp remains normal.  will continue to hold dyazide for now.  there are no medical contraindications to surgery

## 2017-03-30 NOTE — PROGRESS NOTE ADULT - SUBJECTIVE AND OBJECTIVE BOX
POD#    AFEBRILE, VITAL SIGNS STABLE    RIGHT KNEE: WOUND DRY AND INTACT, NO DISCHARGE OR  BLEEDING                        UNCHANGED EDEMA                        GOOD MOTOR TO LEFT LEG                        NEUROVASCULAR INTACT                        NO CALF TENDERNESS      03/29/2017      H/H  10.6/33.1    A/P  POD # S/P RIGHT TKR                      WEIGHT BEARING AS TOLERATED, OOB, PT , ANTICOAGULATION WITH LOVENOX ONLY, NEW AQUACEL                      DRESSING APPLIED                                            ANEMIA POST OP ACUTE BLOOD LOSS                     CONTINUE WITH IRON SUPPLEMENT MAY REQUIRE BLOOD TRANSFUSION PRIOR, INTRA-OP OR POST HER                     LEFT TOTAL KNEE REPLACEMENT ON MONDAY 04/03/2017                                             PLAN FOR A LEFT TOTAL KNEE REPLACEMENT ON MONDAY 04/03/2017 , WILL PRE-OP, MEDICAL F/U                      DR. PERLMAN/ DR FORTUNE NOTED AND APPRECIATED

## 2017-03-31 RX ORDER — SODIUM CHLORIDE 9 MG/ML
1000 INJECTION, SOLUTION INTRAVENOUS
Qty: 0 | Refills: 0 | Status: DISCONTINUED | OUTPATIENT
Start: 2017-04-02 | End: 2017-04-03

## 2017-03-31 RX ORDER — CEFAZOLIN SODIUM 1 G
2000 VIAL (EA) INJECTION ONCE
Qty: 0 | Refills: 0 | Status: COMPLETED | OUTPATIENT
Start: 2017-04-03 | End: 2017-04-03

## 2017-03-31 RX ADMIN — Medication 650 MILLIGRAM(S): at 07:17

## 2017-03-31 RX ADMIN — Medication 650 MILLIGRAM(S): at 06:16

## 2017-03-31 RX ADMIN — DORZOLAMIDE HYDROCHLORIDE TIMOLOL MALEATE 1 DROP(S): 20; 5 SOLUTION/ DROPS OPHTHALMIC at 18:07

## 2017-03-31 RX ADMIN — Medication 650 MILLIGRAM(S): at 13:08

## 2017-03-31 RX ADMIN — Medication 325 MILLIGRAM(S): at 18:08

## 2017-03-31 RX ADMIN — HYDROMORPHONE HYDROCHLORIDE 2 MILLIGRAM(S): 2 INJECTION INTRAMUSCULAR; INTRAVENOUS; SUBCUTANEOUS at 13:38

## 2017-03-31 RX ADMIN — HYDROMORPHONE HYDROCHLORIDE 2 MILLIGRAM(S): 2 INJECTION INTRAMUSCULAR; INTRAVENOUS; SUBCUTANEOUS at 13:08

## 2017-03-31 RX ADMIN — Medication 100 MILLIGRAM(S): at 13:07

## 2017-03-31 RX ADMIN — Medication 650 MILLIGRAM(S): at 21:24

## 2017-03-31 RX ADMIN — Medication 500 MILLIGRAM(S): at 06:16

## 2017-03-31 RX ADMIN — Medication 1 MILLIGRAM(S): at 13:07

## 2017-03-31 RX ADMIN — Medication 100 MILLIGRAM(S): at 06:16

## 2017-03-31 RX ADMIN — Medication 100 MILLIGRAM(S): at 21:24

## 2017-03-31 RX ADMIN — Medication 650 MILLIGRAM(S): at 22:30

## 2017-03-31 RX ADMIN — LATANOPROST 1 DROP(S): 0.05 SOLUTION/ DROPS OPHTHALMIC; TOPICAL at 21:24

## 2017-03-31 RX ADMIN — SERTRALINE 50 MILLIGRAM(S): 25 TABLET, FILM COATED ORAL at 13:07

## 2017-03-31 RX ADMIN — Medication 325 MILLIGRAM(S): at 13:08

## 2017-03-31 RX ADMIN — CELECOXIB 200 MILLIGRAM(S): 200 CAPSULE ORAL at 18:07

## 2017-03-31 RX ADMIN — ENOXAPARIN SODIUM 40 MILLIGRAM(S): 100 INJECTION SUBCUTANEOUS at 13:06

## 2017-03-31 RX ADMIN — DORZOLAMIDE HYDROCHLORIDE TIMOLOL MALEATE 1 DROP(S): 20; 5 SOLUTION/ DROPS OPHTHALMIC at 06:16

## 2017-03-31 RX ADMIN — Medication 325 MILLIGRAM(S): at 08:52

## 2017-03-31 RX ADMIN — Medication 500 MILLIGRAM(S): at 18:07

## 2017-03-31 RX ADMIN — CELECOXIB 200 MILLIGRAM(S): 200 CAPSULE ORAL at 08:52

## 2017-03-31 RX ADMIN — Medication 1 TABLET(S): at 13:07

## 2017-03-31 NOTE — CHART NOTE - NSCHARTNOTEFT_GEN_A_CORE
ORTHOPEDIC SURGERY/ DR. RUIZ     RISK AND BENEFITS OF LEFT TKR ON MONDAY 04/03/2017 IN  DETAILS WERE DISCUSSED WITH THE PATIENT.  POSSIBILITY OF BLOOD TRANSFUSION PRE, INTRA-OP, POST OP WAS DISCUSSED.  PATIENT AGREES WITH SURGERY ON MONDAY 04/03/2017.  PRE-OP LABS, LEFT KNEE X-RAY.  ORDERED.  MEDICAL CLEARANCE AS PER DR. PERLMAN/ DEVIKA DIAZ.  NPO AFTER MIDNIGHT ON SUNDAY 04/02/2017.  HOLD LOVENOX ON MONDAY 04/03/2017.   .

## 2017-03-31 NOTE — PROGRESS NOTE ADULT - SUBJECTIVE AND OBJECTIVE BOX
Patient is a 76y old  Female who presents with a chief complaint of Pt states " I am having a RIGHT Knee Replacement....(pt notes she is then scheduled for left knee replacement week later) (27 Mar 2017 07:36)      INTERVAL HPI/OVERNIGHT EVENTS:  T(C): 36.7, Max: 37.1 (03-30 @ 21:16)  HR: 66 (66 - 67)  BP: 109/71 (105/68 - 117/67)  RR: 18 (16 - 18)  SpO2: 100% (99% - 100%)  Wt(kg): --  I&O's Summary      LABS:              CAPILLARY BLOOD GLUCOSE            MEDICATIONS  (STANDING):  acetaminophen   Tablet. 650milliGRAM(s) Oral every 8 hours  celecoxib 200milliGRAM(s) Oral two times a day after meals  docusate sodium 100milliGRAM(s) Oral three times a day  ferrous    sulfate 325milliGRAM(s) Oral three times a day with meals  folic acid 1milliGRAM(s) Oral daily  multivitamin 1Tablet(s) Oral daily  ascorbic acid 500milliGRAM(s) Oral two times a day  enoxaparin Injectable 40milliGRAM(s) SubCutaneous daily  sertraline 50milliGRAM(s) Oral daily  dorzolamide 2%/timolol 0.5% Ophthalmic Solution 1Drop(s) Both EYES two times a day  latanoprost 0.005% Ophthalmic Solution 1Drop(s) Both EYES at bedtime    MEDICATIONS  (PRN):  ondansetron Injectable 4milliGRAM(s) IV Push every 6 hours PRN Nausea and/or Vomiting  HYDROmorphone   Tablet 2milliGRAM(s) Oral every 4 hours PRN Moderate Pain (4 - 6)  HYDROmorphone   Tablet 4milliGRAM(s) Oral every 4 hours PRN Severe Pain (7 - 10)  morphine  - Injectable 2milliGRAM(s) IV Push every 4 hours PRN Severe Pain (7 - 10)      REVIEW OF SYSTEMS:  CONSTITUTIONAL: No fever, weight loss, or fatigue  EYES: No eye pain, visual disturbances, or discharge  ENMT:  No difficulty hearing, tinnitus, vertigo; No sinus or throat pain  NECK: No pain or stiffness  RESPIRATORY: No cough, wheezing, chills or hemoptysis; No shortness of breath  CARDIOVASCULAR: No chest pain, palpitations, dizziness, or leg swelling  GASTROINTESTINAL: No abdominal or epigastric pain. No nausea, vomiting, or hematemesis; No diarrhea or constipation. No melena or hematochezia.  GENITOURINARY: No dysuria, frequency, hematuria, or incontinence  NEUROLOGICAL: No headaches, memory loss, loss of strength, numbness, or tremors  SKIN: No itching, burning, rashes, or lesions   LYMPH NODES: No enlarged glands  ENDOCRINE: No heat or cold intolerance; No hair loss  MUSCULOSKELETAL: No joint pain or swelling; No muscle, back, or extremity pain  PSYCHIATRIC: No depression, anxiety, mood swings, or difficulty sleeping  HEME/LYMPH: No easy bruising, or bleeding gums  ALLERY AND IMMUNOLOGIC: No hives or eczema    RADIOLOGY & ADDITIONAL TESTS:    Imaging Personally Reviewed:  [ ] YES  [ ] NO    Consultant(s) Notes Reviewed:  [ ] YES  [ ] NO    PHYSICAL EXAM:  GENERAL: NAD, well-groomed, well-developed  HEAD:  Atraumatic, Normocephalic  EYES: EOMI, PERRLA, conjunctiva and sclera clear  ENMT: No tonsillar erythema, exudates, or enlargement; Moist mucous membranes, Good dentition, No lesions  NECK: Supple, No JVD, Normal thyroid  NERVOUS SYSTEM:  Alert & Oriented X3, Good concentration; Motor Strength 5/5 B/L upper and lower extremities; DTRs 2+ intact and symmetric  CHEST/LUNG: Clear to percussion bilaterally; No rales, rhonchi, wheezing, or rubs  HEART: Regular rate and rhythm; No murmurs, rubs, or gallops  ABDOMEN: Soft, Nontender, Nondistended; Bowel sounds present  EXTREMITIES:  2+ Peripheral Pulses, No clubbing, cyanosis, or edema  LYMPH: No lymphadenopathy noted  SKIN: No rashes or lesions    Care Discussed with Consultants/Other Providers [ ] YES  [ ] NO

## 2017-03-31 NOTE — PROGRESS NOTE ADULT - SUBJECTIVE AND OBJECTIVE BOX
ROSALVA WERNER      76y   female  MRN-[id number]      ORTHOPEDIC SURGERY / DR. RUIZ    POD # 4    Vital Signs Last 24 Hrs  T(C): 36.8, Max: 37.1 (03-30 @ 21:16)  T(F): 98.2, Max: 98.8 (03-30 @ 21:16)  HR: 67 (66 - 82)  BP: 117/67 (105/68 - 134/71)  BP(mean): --  RR: 16 (16 - 17)  SpO2: 99% (97% - 99%)       RIGHT KNEE :                  NEW AQUACEL DRESSING DRY AND INTACT                                           SOME EDEMA UNCHARGED                                            GOOD MOTOR  RIGHT LOWER EXTREMITY                                           NEURO-VASCULAR STATUS INTACT                       03/29/2017      HGB: 10.6  / HCT 33.1                                                                                                                           ASSESSMENT &  PLAN:  POD # 4  S/P  RIGHT TOTAL KNEE REPLACEMENT                                               WEIGHT BEARING AS TOLERATED, OOB AND AMBULATE, PT,                                             DVT PROPHYLAXIS LOVENOX ONLY                                             INCENTIVE SPIROMETRY                                              PLAN  FOR A LEFT TKR ON MONDAY 04/03/2017, WILL PREOP                                               MEDICAL FOLLOW UP AND CLEARANCE  DR. PERLMAN /  DR. FORTUNE                                                                        DISCHARGE PLANNING  REHAB NEXT WEEK POST LEFT TOTAL  KNEE  REPLACEMENT ROSALVA WERNER      76y   female  MRN-[id number]      ORTHOPEDIC SURGERY / DR. RUIZ    POD # 4    Vital Signs Last 24 Hrs  T(C): 36.8, Max: 37.1 (03-30 @ 21:16)  T(F): 98.2, Max: 98.8 (03-30 @ 21:16)  HR: 67 (66 - 82)  BP: 117/67 (105/68 - 134/71)  BP(mean): --  RR: 16 (16 - 17)  SpO2: 99% (97% - 99%)       RIGHT KNEE :              NEW AQUACEL DRESSING DRY AND INTACT   SOME EDEMA UNCHARGED    GOOD MOTOR  RIGHT LOWER EXTREMITY   NEURO-VASCULAR STATUS INTACT     03/29/2017      HGB: 10.6  / HCT 33.1                                                                                                                           ASSESSMENT &  PLAN:  POD # 4  S/P  RIGHT TOTAL KNEE REPLACEMENT    WEIGHT BEARING AS TOLERATED, OOB AND AMBULATE, PT,  DVT PROPHYLAXIS LOVENOX ONLY  INCENTIVE SPIROMETRY  PLAN  FOR A LEFT TKR ON MONDAY 04/03/2017, WILL PREOP    MEDICAL FOLLOW UP AND CLEARANCE  DR. PERLMAN /  DR. FORTUNE                             D/C IVF AND HEPLOCK IV AFTER TOLERATES DIETSCHARGE PLANNING  REHAB NEXT WEEK POST LEFT TOTAL  KNEE  REPLACEMENT

## 2017-04-01 DIAGNOSIS — M79.661 PAIN IN RIGHT LOWER LEG: ICD-10-CM

## 2017-04-01 PROCEDURE — 93971 EXTREMITY STUDY: CPT | Mod: 26,RT

## 2017-04-01 RX ORDER — POLYETHYLENE GLYCOL 3350 17 G/17G
17 POWDER, FOR SOLUTION ORAL DAILY
Qty: 0 | Refills: 0 | Status: DISCONTINUED | OUTPATIENT
Start: 2017-04-01 | End: 2017-04-03

## 2017-04-01 RX ADMIN — ENOXAPARIN SODIUM 40 MILLIGRAM(S): 100 INJECTION SUBCUTANEOUS at 11:53

## 2017-04-01 RX ADMIN — CELECOXIB 200 MILLIGRAM(S): 200 CAPSULE ORAL at 08:15

## 2017-04-01 RX ADMIN — LATANOPROST 1 DROP(S): 0.05 SOLUTION/ DROPS OPHTHALMIC; TOPICAL at 22:02

## 2017-04-01 RX ADMIN — SERTRALINE 50 MILLIGRAM(S): 25 TABLET, FILM COATED ORAL at 11:52

## 2017-04-01 RX ADMIN — CELECOXIB 200 MILLIGRAM(S): 200 CAPSULE ORAL at 18:46

## 2017-04-01 RX ADMIN — Medication 500 MILLIGRAM(S): at 05:19

## 2017-04-01 RX ADMIN — MORPHINE SULFATE 2 MILLIGRAM(S): 50 CAPSULE, EXTENDED RELEASE ORAL at 17:06

## 2017-04-01 RX ADMIN — HYDROMORPHONE HYDROCHLORIDE 2 MILLIGRAM(S): 2 INJECTION INTRAMUSCULAR; INTRAVENOUS; SUBCUTANEOUS at 02:00

## 2017-04-01 RX ADMIN — Medication 325 MILLIGRAM(S): at 08:15

## 2017-04-01 RX ADMIN — Medication 1 MILLIGRAM(S): at 11:52

## 2017-04-01 RX ADMIN — Medication 100 MILLIGRAM(S): at 05:19

## 2017-04-01 RX ADMIN — Medication 100 MILLIGRAM(S): at 22:02

## 2017-04-01 RX ADMIN — DORZOLAMIDE HYDROCHLORIDE TIMOLOL MALEATE 1 DROP(S): 20; 5 SOLUTION/ DROPS OPHTHALMIC at 18:46

## 2017-04-01 RX ADMIN — DORZOLAMIDE HYDROCHLORIDE TIMOLOL MALEATE 1 DROP(S): 20; 5 SOLUTION/ DROPS OPHTHALMIC at 05:20

## 2017-04-01 RX ADMIN — Medication 325 MILLIGRAM(S): at 11:52

## 2017-04-01 RX ADMIN — MORPHINE SULFATE 2 MILLIGRAM(S): 50 CAPSULE, EXTENDED RELEASE ORAL at 16:20

## 2017-04-01 RX ADMIN — HYDROMORPHONE HYDROCHLORIDE 2 MILLIGRAM(S): 2 INJECTION INTRAMUSCULAR; INTRAVENOUS; SUBCUTANEOUS at 17:06

## 2017-04-01 RX ADMIN — Medication 100 MILLIGRAM(S): at 13:13

## 2017-04-01 RX ADMIN — Medication 1 TABLET(S): at 11:52

## 2017-04-01 RX ADMIN — HYDROMORPHONE HYDROCHLORIDE 2 MILLIGRAM(S): 2 INJECTION INTRAMUSCULAR; INTRAVENOUS; SUBCUTANEOUS at 16:31

## 2017-04-01 RX ADMIN — Medication 325 MILLIGRAM(S): at 17:07

## 2017-04-01 RX ADMIN — Medication 500 MILLIGRAM(S): at 17:07

## 2017-04-01 NOTE — PROGRESS NOTE ADULT - PROBLEM SELECTOR PLAN 2
s/p r tkr for left on monday stable doing well with pt  s/p r tkr for left on monday bilateral knee oa  stable doing well with pt  s/p r tkr for left on monday

## 2017-04-01 NOTE — PROGRESS NOTE ADULT - SUBJECTIVE AND OBJECTIVE BOX
Patient is a 76y old  Female who presents with a chief complaint of Pt states " I am having a RIGHT Knee Replacement....(pt notes she is then scheduled for left knee replacement week later) (27 Mar 2017 07:36)      INTERVAL HPI/OVERNIGHT EVENTS:    MEDICATIONS  (STANDING):  celecoxib 200milliGRAM(s) Oral two times a day after meals  docusate sodium 100milliGRAM(s) Oral three times a day  ferrous    sulfate 325milliGRAM(s) Oral three times a day with meals  folic acid 1milliGRAM(s) Oral daily  multivitamin 1Tablet(s) Oral daily  ascorbic acid 500milliGRAM(s) Oral two times a day  enoxaparin Injectable 40milliGRAM(s) SubCutaneous daily  sertraline 50milliGRAM(s) Oral daily  dorzolamide 2%/timolol 0.5% Ophthalmic Solution 1Drop(s) Both EYES two times a day  latanoprost 0.005% Ophthalmic Solution 1Drop(s) Both EYES at bedtime    MEDICATIONS  (PRN):  ondansetron Injectable 4milliGRAM(s) IV Push every 6 hours PRN Nausea and/or Vomiting  HYDROmorphone   Tablet 2milliGRAM(s) Oral every 4 hours PRN Moderate Pain (4 - 6)  HYDROmorphone   Tablet 4milliGRAM(s) Oral every 4 hours PRN Severe Pain (7 - 10)  morphine  - Injectable 2milliGRAM(s) IV Push every 4 hours PRN Severe Pain (7 - 10)  polyethylene glycol 3350 17Gram(s) Oral daily PRN Constipation      Allergies    latex (Rash)  No Known Drug Allergies    Intolerances        REVIEW OF SYSTEMS:  CONSTITUTIONAL: No fever, No chills,No fatigue,No myalgia,No Body ache  EYES: No eye pain, visual disturbances, or discharge  ENMT:  No ear pain, No nose bleed, No vertigo; No sinus or throat pain  NECK: No pain, No stiffness  RESPIRATORY: No cough, wheezing, No  hemoptysis; No shortness of breath  CARDIOVASCULAR: No chest pain, palpitations, leg swelling  GASTROINTESTINAL: No abdominal or epigastric pain. No nausea, No vomiting; No diarrhea or constipation. [ ] BM  GENITOURINARY: No dysuria, No frequency, No urgency, No hematuria, or incontinence  NEUROLOGICAL: alert and oriented x 3,  No headaches, No dizziness, No numbness,  SKIN:   No itching, burning, rashes, or lesions   MUSCULOSKELETAL: No joint pain or swelling; No muscle pain, No back pain, No extremity pain  PSYCHIATRIC: No depression, anxiety, mood swings, or difficulty sleeping  ROS  [ ] Unable to obtain   REST OF REVIEW Of SYSTEM - [ ] Normal     Height (cm): 162.6 (03-27 @ 07:53), 162.6 (03-20 @ 14:14)  Weight (kg): 79.4 (03-27 @ 07:53), 80.7 (03-20 @ 14:14)  BMI (kg/m2): 30 (03-27 @ 07:53), 30.5 (03-20 @ 14:14)  BSA (m2): 1.85 (03-27 @ 07:53), 1.86 (03-20 @ 14:14)  Vital Signs Last 24 Hrs  T(C): 36.4, Max: 36.9 (03-31 @ 20:16)  T(F): 97.6, Max: 98.5 (03-31 @ 20:16)  HR: 65 (63 - 66)  BP: 107/72 (106/66 - 109/71)  BP(mean): --  RR: 16 (16 - 18)  SpO2: 99% (95% - 100%)  [ ] room air   [ ] 02    PHYSICAL EXAM:  GENERAL:  No acute distresss, well appearing, [ ] Agitated, [ ] Lethargy, [ ] confused   HEAD:  normal  ENMT: normal  NECK:  normal    NERVOUS SYSTEM:  Alert & Oriented X3, no focal deficits [ ]Confusion  [ ] Encephalopathic [ ] Sedated [ ] Other  CHEST/LUNG: Clear to auscultation bilaterally,  [ ] decreased breath sounds at bases  [ ] wheezing   [ ] rhonchi  [ ] crackles  HEART:  Regular rate and rhythm, No murmurs, rubs, or gallops,  [ ] irregular   ABDOMEN:  soft, nontender, nondistended, positive bowel sounds   [ ] obese  EXTREMITIES: No clubbing, cyanosis or edema  SKIN: [ ] venous stasis skin changes    LABS:            Hemoglobin A1C, Whole Blood: 5.7 % (03-20 @ 20:45)      CAPILLARY BLOOD GLUCOSE    Cultures          RADIOLOGY & ADDITIONAL TESTS:      Care Discussed with [X] Consultants  [ ] Patient  [ ] Family  [X]   /   [ ] Other; RN  DVT prophylaxis [ ] lovenox   [ ] subq heparin  [ ] coumadin  [ ] venodynes [ ] ambulating frequently at how risk for vte and no pharm         or  mechanical prophylaxis required    [ ] other   Advanced directive:    [ ]pt has hcp     [ ] pt declined to assign hcp  Discussed with pt @ bedside Patient is a 76y old  Female who presents with a chief complaint of Pt states " I am having a RIGHT Knee Replacement....(pt notes she is then scheduled for left knee replacement week later) (27 Mar 2017 07:36)      INTERVAL HPI/OVERNIGHT EVENTS: doing well    MEDICATIONS  (STANDING):  celecoxib 200milliGRAM(s) Oral two times a day after meals  docusate sodium 100milliGRAM(s) Oral three times a day  ferrous    sulfate 325milliGRAM(s) Oral three times a day with meals  folic acid 1milliGRAM(s) Oral daily  multivitamin 1Tablet(s) Oral daily  ascorbic acid 500milliGRAM(s) Oral two times a day  enoxaparin Injectable 40milliGRAM(s) SubCutaneous daily  sertraline 50milliGRAM(s) Oral daily  dorzolamide 2%/timolol 0.5% Ophthalmic Solution 1Drop(s) Both EYES two times a day  latanoprost 0.005% Ophthalmic Solution 1Drop(s) Both EYES at bedtime    MEDICATIONS  (PRN):  ondansetron Injectable 4milliGRAM(s) IV Push every 6 hours PRN Nausea and/or Vomiting  HYDROmorphone   Tablet 2milliGRAM(s) Oral every 4 hours PRN Moderate Pain (4 - 6)  HYDROmorphone   Tablet 4milliGRAM(s) Oral every 4 hours PRN Severe Pain (7 - 10)  morphine  - Injectable 2milliGRAM(s) IV Push every 4 hours PRN Severe Pain (7 - 10)  polyethylene glycol 3350 17Gram(s) Oral daily PRN Constipation      Allergies  latex (Rash)          REVIEW OF SYSTEMS:  CONSTITUTIONAL: No fever, No chills,No fatigue,No myalgia,No Body ache  EYES: No eye pain, visual disturbances, or discharge  ENMT:  No ear pain, No nose bleed, No vertigo; No sinus or throat pain  NECK: No pain, No stiffness  RESPIRATORY: No cough, wheezing, No  hemoptysis; No shortness of breath  CARDIOVASCULAR: No chest pain, palpitations, leg swelling  GASTROINTESTINAL: No abdominal or epigastric pain. No nausea, No vomiting; No diarrhea or constipation. [ ] BM  GENITOURINARY: No dysuria, No frequency, No urgency, No hematuria, or incontinence  NEUROLOGICAL: alert and oriented x 3,  No headaches, No dizziness, No numbness,  SKIN:   No itching, burning, rashes, or lesions   MUSCULOSKELETAL: No joint pain or swelling; No muscle pain, No back pain, No extremity pain  PSYCHIATRIC: No depression, anxiety, mood swings, or difficulty sleeping  ROS  [ ] Unable to obtain   REST OF REVIEW Of SYSTEM - [ ] Normal     Height (cm): 162.6 (03-27 @ 07:53), 162.6 (03-20 @ 14:14)  Weight (kg): 79.4 (03-27 @ 07:53), 80.7 (03-20 @ 14:14)  BMI (kg/m2): 30 (03-27 @ 07:53), 30.5 (03-20 @ 14:14)  BSA (m2): 1.85 (03-27 @ 07:53), 1.86 (03-20 @ 14:14)  Vital Signs Last 24 Hrs  T(C): 36.4, Max: 36.9 (03-31 @ 20:16)  T(F): 97.6, Max: 98.5 (03-31 @ 20:16)  HR: 65 (63 - 66)  BP: 107/72 (106/66 - 109/71)  BP(mean): --  RR: 16 (16 - 18)  SpO2: 99% (95% - 100%)  [ x] room air   [ ] 02    PHYSICAL EXAM:  GENERAL:  No acute distresss, well appearing, [ ] Agitated, [ ] Lethargy, [ ] confused   HEAD:  normal  ENMT: normal  NECK:  normal    NERVOUS SYSTEM:  Alert & Oriented X3, no focal deficits [ ]Confusion  [ ] Encephalopathic [ ] Sedated [ ] Other  CHEST/LUNG: Clear to auscultation bilaterally,  [ ] decreased breath sounds at bases  [ ] wheezing   [ ] rhonchi  [ ] crackles  HEART:  Regular rate and rhythm, No murmurs, rubs, or gallops,  [ ] irregular   ABDOMEN:  soft, nontender, nondistended, positive bowel sounds   [ ] obese  EXTREMITIES: No clubbing, cyanosis or edema  SKIN: [ ] venous stasis skin changes    LABS:  no new labs          Hemoglobin A1C, Whole Blood: 5.7 % (03-20 @ 20:45)      CAPILLARY BLOOD GLUCOSE            RADIOLOGY & ADDITIONAL TESTS:      Care Discussed with [X] Consultants  [ x] Patient  [ ] Family  [ ]   /   [ ] Other; RN  DVT prophylaxis [x ] lovenox   [ ] subq heparin  [ ] coumadin  [ ] venodynes [ ] ambulating frequently at how risk for vte and no pharm         or  mechanical prophylaxis required    [ ] other   Advanced directive:    [x]pt has hcp     [ ] pt declined to assign hcp  Discussed with pt @ bedside Patient is a 76y old  Female who presents with a chief complaint of Pt states " I am having a RIGHT Knee Replacement....(pt notes she is then scheduled for left knee replacement week later) (27 Mar 2017 07:36)      INTERVAL HPI/OVERNIGHT EVENTS: doing well    MEDICATIONS  (STANDING):  celecoxib 200milliGRAM(s) Oral two times a day after meals  docusate sodium 100milliGRAM(s) Oral three times a day  ferrous    sulfate 325milliGRAM(s) Oral three times a day with meals  folic acid 1milliGRAM(s) Oral daily  multivitamin 1Tablet(s) Oral daily  ascorbic acid 500milliGRAM(s) Oral two times a day  enoxaparin Injectable 40milliGRAM(s) SubCutaneous daily  sertraline 50milliGRAM(s) Oral daily  dorzolamide 2%/timolol 0.5% Ophthalmic Solution 1Drop(s) Both EYES two times a day  latanoprost 0.005% Ophthalmic Solution 1Drop(s) Both EYES at bedtime    MEDICATIONS  (PRN):  ondansetron Injectable 4milliGRAM(s) IV Push every 6 hours PRN Nausea and/or Vomiting  HYDROmorphone   Tablet 2milliGRAM(s) Oral every 4 hours PRN Moderate Pain (4 - 6)  HYDROmorphone   Tablet 4milliGRAM(s) Oral every 4 hours PRN Severe Pain (7 - 10)  morphine  - Injectable 2milliGRAM(s) IV Push every 4 hours PRN Severe Pain (7 - 10)  polyethylene glycol 3350 17Gram(s) Oral daily PRN Constipation      Allergies  latex (Rash)          REVIEW OF SYSTEMS:  CONSTITUTIONAL: No fever, No chills,No fatigue,No myalgia,No Body ache  EYES: No eye pain, visual disturbances, or discharge  ENMT:  No ear pain, No nose bleed, No vertigo; No sinus or throat pain  NECK: No pain, No stiffness  RESPIRATORY: No cough, wheezing, No  hemoptysis; No shortness of breath  CARDIOVASCULAR: No chest pain, palpitations, leg swelling  GASTROINTESTINAL: No abdominal or epigastric pain. No nausea, No vomiting; No diarrhea or constipation. [x] BM x 3 today  GENITOURINARY: No dysuria, No frequency, No urgency, No hematuria, or incontinence  NEUROLOGICAL: alert and oriented x 3,  No headaches, No dizziness, No numbness,  SKIN:   No itching, burning, rashes, or lesions   MUSCULOSKELETAL: pain edema r leg knee minimal r calf pain on palpation  trace edema left leg  PSYCHIATRIC: No depression, anxiety, mood swings, or difficulty sleeping  ROS  [ ] Unable to obtain   REST OF REVIEW Of SYSTEM - [ ] Normal     Height (cm): 162.6 (03-27 @ 07:53), 162.6 (03-20 @ 14:14)  Weight (kg): 79.4 (03-27 @ 07:53), 80.7 (03-20 @ 14:14)  BMI (kg/m2): 30 (03-27 @ 07:53), 30.5 (03-20 @ 14:14)  BSA (m2): 1.85 (03-27 @ 07:53), 1.86 (03-20 @ 14:14)  Vital Signs Last 24 Hrs  T(C): 36.4, Max: 36.9 (03-31 @ 20:16)  T(F): 97.6, Max: 98.5 (03-31 @ 20:16)  HR: 65 (63 - 66)  BP: 107/72 (106/66 - 109/71)  BP(mean): --  RR: 16 (16 - 18)  SpO2: 99% (95% - 100%)  [ x] room air   [ ] 02    PHYSICAL EXAM:  GENERAL:  No acute distresss, well appearing, [ ] Agitated, [ ] Lethargy, [ ] confused   HEAD:  normal  ENMT: normal  NECK:  normal    NERVOUS SYSTEM:  Alert & Oriented X3, no focal deficits [ ]Confusion  [ ] Encephalopathic [ ] Sedated [ ] Other  CHEST/LUNG: Clear to auscultation bilaterally,  [ ] decreased breath sounds at bases  [ ] wheezing   [ ] rhonchi  [ ] crackles  HEART:  Regular rate and rhythm, No murmurs, rubs, or gallops,  [ ] irregular   ABDOMEN:  soft, nontender, nondistended, positive bowel sounds   [ ] obese  EXTREMITIES: No clubbing, cyanosis or edema  SKIN: [ ] venous stasis skin changes    LABS:  no new labs          Hemoglobin A1C, Whole Blood: 5.7 % (03-20 @ 20:45)                        Care Discussed with [X] Consultants  [ x] Patient  [ ] Family  [ ]   /   [ ] Other; RN  DVT prophylaxis [x ] lovenox   [ ] subq heparin  [ ] coumadin  [ ] venodynes [ ] ambulating frequently at how risk for vte and no pharm         or  mechanical prophylaxis required    [ ] other   Advanced directive:    [x]pt has hcp     [ ] pt declined to assign hcp  Discussed with pt @ bedside Patient is a 76y old  Female who presents with a chief complaint of Pt states " I am having a RIGHT Knee Replacement....(pt notes she is then scheduled for left knee replacement week later) (27 Mar 2017 07:36)      INTERVAL HPI/OVERNIGHT EVENTS: doing well    MEDICATIONS  (STANDING):  celecoxib 200milliGRAM(s) Oral two times a day after meals  docusate sodium 100milliGRAM(s) Oral three times a day  ferrous    sulfate 325milliGRAM(s) Oral three times a day with meals  folic acid 1milliGRAM(s) Oral daily  multivitamin 1Tablet(s) Oral daily  ascorbic acid 500milliGRAM(s) Oral two times a day  enoxaparin Injectable 40milliGRAM(s) SubCutaneous daily  sertraline 50milliGRAM(s) Oral daily  dorzolamide 2%/timolol 0.5% Ophthalmic Solution 1Drop(s) Both EYES two times a day  latanoprost 0.005% Ophthalmic Solution 1Drop(s) Both EYES at bedtime    MEDICATIONS  (PRN):  ondansetron Injectable 4milliGRAM(s) IV Push every 6 hours PRN Nausea and/or Vomiting  HYDROmorphone   Tablet 2milliGRAM(s) Oral every 4 hours PRN Moderate Pain (4 - 6)  HYDROmorphone   Tablet 4milliGRAM(s) Oral every 4 hours PRN Severe Pain (7 - 10)  morphine  - Injectable 2milliGRAM(s) IV Push every 4 hours PRN Severe Pain (7 - 10)  polyethylene glycol 3350 17Gram(s) Oral daily PRN Constipation      Allergies  latex (Rash)          REVIEW OF SYSTEMS:  CONSTITUTIONAL: No fever, No chills,No fatigue,No myalgia,No Body ache  EYES: No eye pain, visual disturbances, or discharge  ENMT:  No ear pain, No nose bleed, No vertigo; No sinus or throat pain  NECK: No pain, No stiffness  RESPIRATORY: No cough, wheezing, No  hemoptysis; No shortness of breath  CARDIOVASCULAR: No chest pain, palpitations, leg swelling  GASTROINTESTINAL: No abdominal or epigastric pain. No nausea, No vomiting; No diarrhea or constipation. [x] BM x 3 today  GENITOURINARY: No dysuria, No frequency, No urgency, No hematuria, or incontinence  NEUROLOGICAL: alert and oriented x 3,  No headaches, No dizziness, No numbness,  SKIN:   No itching, burning, rashes, or lesions   MUSCULOSKELETAL: pain edema r leg knee minimal r calf pain on palpation  trace edema left leg  PSYCHIATRIC: No depression, anxiety, mood swings, or difficulty sleeping  ROS  [ ] Unable to obtain   REST OF REVIEW Of SYSTEM - [ ] Normal     Height (cm): 162.6 (03-27 @ 07:53), 162.6 (03-20 @ 14:14)  Weight (kg): 79.4 (03-27 @ 07:53), 80.7 (03-20 @ 14:14)  BMI (kg/m2): 30 (03-27 @ 07:53), 30.5 (03-20 @ 14:14)  BSA (m2): 1.85 (03-27 @ 07:53), 1.86 (03-20 @ 14:14)  Vital Signs Last 24 Hrs  T(C): 36.4, Max: 36.9 (03-31 @ 20:16)  T(F): 97.6, Max: 98.5 (03-31 @ 20:16)  HR: 65 (63 - 66)  BP: 107/72 (106/66 - 109/71)  BP(mean): --  RR: 16 (16 - 18)  SpO2: 99% (95% - 100%)  [ x] room air   [ ] 02    PHYSICAL EXAM:  GENERAL:  No acute distresss, well appearing, [ ] Agitated, [ ] Lethargy, [ ] confused   HEAD:  normal  ENMT: normal  NECK:  normal    NERVOUS SYSTEM:  Alert & Oriented X3, no focal deficits [ ]Confusion  [ ] Encephalopathic [ ] Sedated [ ] Other  CHEST/LUNG: Clear to auscultation bilaterally,  [ ] decreased breath sounds at bases  [ ] wheezing   [ ] rhonchi  [ ] crackles  HEART:  Regular rate and rhythm, No murmurs, rubs, or gallops,  [ ] irregular   ABDOMEN:  soft, nontender, nondistended, positive bowel sounds   [ ] obese  EXTREMITIES: No clubbing, cyanosis  tr edema on left   edema mimimal erythema on r knee  minimal pain on palpation right calf  SKIN: [ ] venous stasis skin changes    LABS:  no new labs          Hemoglobin A1C, Whole Blood: 5.7 % (03-20 @ 20:45)                        Care Discussed with [X] Consultants  [ x] Patient  [ ] Family  [ ]   /   [ ] Other; RN  DVT prophylaxis [x ] lovenox   [ ] subq heparin  [ ] coumadin  [ ] venodynes [ ] ambulating frequently at how risk for vte and no pharm         or  mechanical prophylaxis required    [ ] other   Advanced directive:    [x]pt has hcp     [ ] pt declined to assign hcp  Discussed with pt @ bedside

## 2017-04-01 NOTE — PROGRESS NOTE ADULT - SUBJECTIVE AND OBJECTIVE BOX
ROSALVA WERNER      76y   female  MRN-[id number]      ORTHOPEDIC SURGERY / DR. RUIZ    POD # 5    Pt feeling well. Pain controlled. No issues overnight.     Vital Signs Last 24 Hrs  T(C): 36.4, Max: 36.9 (03-31 @ 20:16)  T(F): 97.6, Max: 98.5 (03-31 @ 20:16)  HR: 65 (63 - 66)  BP: 107/72 (106/66 - 109/71)  BP(mean): --  RR: 16 (16 - 18)  SpO2: 99% (95% - 100%)       RIGHT KNEE :              DRESSING clean, DRY AND INTACT   SOME EDEMA UNCHARGED    GOOD MOTOR  RIGHT LOWER EXTREMITY   NEURO-VASCULAR STATUS INTACT     03/29/2017      HGB: 10.6  / HCT 33.1                                                                                                                           ASSESSMENT &  PLAN:  POD # 5  S/P  RIGHT TOTAL KNEE REPLACEMENT    WEIGHT BEARING AS TOLERATED, OOB AND AMBULATE, PT,  DVT PROPHYLAXIS LOVENOX ONLY  INCENTIVE SPIROMETRY  PLAN  FOR A LEFT TKR ON MONDAY 04/03/2017, WILL PREOP    Med Clearance appreciated                               DISCHARGE PLANNING  REHAB NEXT WEEK POST LEFT TOTAL  KNEE  REPLACEMENT

## 2017-04-01 NOTE — PROGRESS NOTE ADULT - PROBLEM SELECTOR PLAN 3
likely sec to pain meds  repeat bp improved  continue to hold dyazide hx of essential htn   in low 100's likely sec to pain meds    bp now improved  continue to hold dyazide hx of essential htn   in low 100's likely sec to pain meds    bp now improved  continue to hold dyazide  trace edema will resume as soon as clear bp stable after surgery monday

## 2017-04-02 ENCOUNTER — TRANSCRIPTION ENCOUNTER (OUTPATIENT)
Age: 77
End: 2017-04-02

## 2017-04-02 LAB
ANION GAP SERPL CALC-SCNC: 9 MMOL/L — SIGNIFICANT CHANGE UP (ref 5–17)
APTT BLD: 33.2 SEC — SIGNIFICANT CHANGE UP (ref 27.5–37.4)
BUN SERPL-MCNC: 20 MG/DL — SIGNIFICANT CHANGE UP (ref 7–23)
CALCIUM SERPL-MCNC: 8.8 MG/DL — SIGNIFICANT CHANGE UP (ref 8.5–10.1)
CHLORIDE SERPL-SCNC: 106 MMOL/L — SIGNIFICANT CHANGE UP (ref 96–108)
CO2 SERPL-SCNC: 27 MMOL/L — SIGNIFICANT CHANGE UP (ref 22–31)
CREAT SERPL-MCNC: 0.69 MG/DL — SIGNIFICANT CHANGE UP (ref 0.5–1.3)
GLUCOSE SERPL-MCNC: 93 MG/DL — SIGNIFICANT CHANGE UP (ref 70–99)
HCT VFR BLD CALC: 31 % — LOW (ref 34.5–45)
HGB BLD-MCNC: 10.3 G/DL — LOW (ref 11.5–15.5)
INR BLD: 1.02 RATIO — SIGNIFICANT CHANGE UP (ref 0.88–1.16)
MCHC RBC-ENTMCNC: 30.4 PG — SIGNIFICANT CHANGE UP (ref 27–34)
MCHC RBC-ENTMCNC: 33.1 GM/DL — SIGNIFICANT CHANGE UP (ref 32–36)
MCV RBC AUTO: 91.7 FL — SIGNIFICANT CHANGE UP (ref 80–100)
PLATELET # BLD AUTO: 246 K/UL — SIGNIFICANT CHANGE UP (ref 150–400)
POTASSIUM SERPL-MCNC: 3.7 MMOL/L — SIGNIFICANT CHANGE UP (ref 3.5–5.3)
POTASSIUM SERPL-SCNC: 3.7 MMOL/L — SIGNIFICANT CHANGE UP (ref 3.5–5.3)
PROTHROM AB SERPL-ACNC: 11.1 SEC — SIGNIFICANT CHANGE UP (ref 9.8–12.7)
RBC # BLD: 3.38 M/UL — LOW (ref 3.8–5.2)
RBC # FLD: 13.1 % — SIGNIFICANT CHANGE UP (ref 10.3–14.5)
SODIUM SERPL-SCNC: 142 MMOL/L — SIGNIFICANT CHANGE UP (ref 135–145)
WBC # BLD: 7 K/UL — SIGNIFICANT CHANGE UP (ref 3.8–10.5)
WBC # FLD AUTO: 7 K/UL — SIGNIFICANT CHANGE UP (ref 3.8–10.5)

## 2017-04-02 PROCEDURE — 73562 X-RAY EXAM OF KNEE 3: CPT | Mod: 26,LT

## 2017-04-02 RX ADMIN — LATANOPROST 1 DROP(S): 0.05 SOLUTION/ DROPS OPHTHALMIC; TOPICAL at 21:12

## 2017-04-02 RX ADMIN — Medication 325 MILLIGRAM(S): at 12:09

## 2017-04-02 RX ADMIN — Medication 100 MILLIGRAM(S): at 05:44

## 2017-04-02 RX ADMIN — HYDROMORPHONE HYDROCHLORIDE 2 MILLIGRAM(S): 2 INJECTION INTRAMUSCULAR; INTRAVENOUS; SUBCUTANEOUS at 01:26

## 2017-04-02 RX ADMIN — Medication 1 MILLIGRAM(S): at 12:09

## 2017-04-02 RX ADMIN — ENOXAPARIN SODIUM 40 MILLIGRAM(S): 100 INJECTION SUBCUTANEOUS at 12:09

## 2017-04-02 RX ADMIN — Medication 100 MILLIGRAM(S): at 21:12

## 2017-04-02 RX ADMIN — Medication 500 MILLIGRAM(S): at 18:03

## 2017-04-02 RX ADMIN — Medication 1 TABLET(S): at 12:09

## 2017-04-02 RX ADMIN — Medication 100 MILLIGRAM(S): at 15:25

## 2017-04-02 RX ADMIN — DORZOLAMIDE HYDROCHLORIDE TIMOLOL MALEATE 1 DROP(S): 20; 5 SOLUTION/ DROPS OPHTHALMIC at 18:03

## 2017-04-02 RX ADMIN — Medication 325 MILLIGRAM(S): at 18:03

## 2017-04-02 RX ADMIN — Medication 325 MILLIGRAM(S): at 08:53

## 2017-04-02 RX ADMIN — CELECOXIB 200 MILLIGRAM(S): 200 CAPSULE ORAL at 19:57

## 2017-04-02 RX ADMIN — CELECOXIB 200 MILLIGRAM(S): 200 CAPSULE ORAL at 08:53

## 2017-04-02 RX ADMIN — Medication 500 MILLIGRAM(S): at 05:44

## 2017-04-02 RX ADMIN — SERTRALINE 50 MILLIGRAM(S): 25 TABLET, FILM COATED ORAL at 12:09

## 2017-04-02 RX ADMIN — CELECOXIB 200 MILLIGRAM(S): 200 CAPSULE ORAL at 09:57

## 2017-04-02 RX ADMIN — DORZOLAMIDE HYDROCHLORIDE TIMOLOL MALEATE 1 DROP(S): 20; 5 SOLUTION/ DROPS OPHTHALMIC at 05:44

## 2017-04-02 RX ADMIN — HYDROMORPHONE HYDROCHLORIDE 2 MILLIGRAM(S): 2 INJECTION INTRAMUSCULAR; INTRAVENOUS; SUBCUTANEOUS at 23:24

## 2017-04-02 RX ADMIN — CELECOXIB 200 MILLIGRAM(S): 200 CAPSULE ORAL at 18:03

## 2017-04-02 NOTE — PROGRESS NOTE ADULT - SUBJECTIVE AND OBJECTIVE BOX
Patient is a 76y old  Female who presents with a chief complaint of Pt states " I am having a RIGHT Knee Replacement. (pt notes she is then scheduled for left knee replacement week later) (27 Mar 2017 07:36)      INTERVAL HPI/OVERNIGHT EVENTS: no acute events overnight. Patient doing well.     MEDICATIONS  (STANDING):  celecoxib 200milliGRAM(s) Oral two times a day after meals  docusate sodium 100milliGRAM(s) Oral three times a day  ferrous    sulfate 325milliGRAM(s) Oral three times a day with meals  folic acid 1milliGRAM(s) Oral daily  multivitamin 1Tablet(s) Oral daily  ascorbic acid 500milliGRAM(s) Oral two times a day  sertraline 50milliGRAM(s) Oral daily  dorzolamide 2%/timolol 0.5% Ophthalmic Solution 1Drop(s) Both EYES two times a day  latanoprost 0.005% Ophthalmic Solution 1Drop(s) Both EYES at bedtime  lactated ringers. 1000milliLiter(s) IV Continuous <Continuous>    MEDICATIONS  (PRN):  ondansetron Injectable 4milliGRAM(s) IV Push every 6 hours PRN Nausea and/or Vomiting  HYDROmorphone   Tablet 2milliGRAM(s) Oral every 4 hours PRN Moderate Pain (4 - 6)  HYDROmorphone   Tablet 4milliGRAM(s) Oral every 4 hours PRN Severe Pain (7 - 10)  morphine  - Injectable 2milliGRAM(s) IV Push every 4 hours PRN Severe Pain (7 - 10)  polyethylene glycol 3350 17Gram(s) Oral daily PRN Constipation      Allergies    latex (Rash)  No Known Drug Allergies    Intolerances        REVIEW OF SYSTEMS:  CONSTITUTIONAL: No fever, No chills,No fatigue,No myalgia,No Body ache  EYES: No eye pain, visual disturbances, or discharge  ENMT:  No ear pain, No nose bleed, No vertigo; No sinus or throat pain  NECK: No pain, No stiffness  RESPIRATORY: No cough, wheezing, No  hemoptysis; No shortness of breath  CARDIOVASCULAR: No chest pain, palpitations, leg swelling  GASTROINTESTINAL: No abdominal or epigastric pain. No nausea, No vomiting; No diarrhea or constipation. [ ] BM  GENITOURINARY: No dysuria, No frequency, No urgency, No hematuria, or incontinence  NEUROLOGICAL: alert and oriented x 3,  No headaches, No dizziness, No numbness,  SKIN:   No itching, burning, rashes, or lesions   MUSCULOSKELETAL: No joint pain or swelling; No muscle pain, No back pain, No extremity pain  PSYCHIATRIC: No depression, anxiety, mood swings, or difficulty sleeping  ROS  [ ] Unable to obtain   REST OF REVIEW Of SYSTEM - [ ] Normal     Height (cm): 162.6 (03-27 @ 07:53), 162.6 (03-20 @ 14:14)  Weight (kg): 79.4 (03-27 @ 07:53), 80.7 (03-20 @ 14:14)  BMI (kg/m2): 30 (03-27 @ 07:53), 30.5 (03-20 @ 14:14)  BSA (m2): 1.85 (03-27 @ 07:53), 1.86 (03-20 @ 14:14)  Vital Signs Last 24 Hrs  T(C): 36.6, Max: 36.7 (04-01 @ 21:30)  T(F): 97.9, Max: 98.1 (04-01 @ 21:30)  HR: 71 (71 - 74)  BP: 121/72 (121/72 - 140/78)  BP(mean): --  RR: 16 (16 - 16)  SpO2: 99% (95% - 99%)  [ ] room air   [ ] 02    PHYSICAL EXAM:  GENERAL:  No acute distresss, well appearing, [ ] Agitated, [ ] Lethargy, [ ] confused   HEAD:  normal  ENMT: normal  NECK:  normal    NERVOUS SYSTEM:  Alert & Oriented X3, no focal deficits [ ]Confusion  [ ] Encephalopathic [ ] Sedated [ ] Other  CHEST/LUNG: Clear to auscultation bilaterally,  [ ] decreased breath sounds at bases  [ ] wheezing   [ ] rhonchi  [ ] crackles  HEART:  Regular rate and rhythm, No murmurs, rubs, or gallops,  [ ] irregular   ABDOMEN:  soft, nontender, nondistended, positive bowel sounds   [ ] obese  EXTREMITIES: No clubbing, cyanosis. no calf tenderness or swelling, mild edema of right knee  SKIN: [ ] venous stasis skin changes    LABS:                        10.3   7.0   )-----------( 246      ( 02 Apr 2017 08:24 )             31.0     02 Apr 2017 08:24    142    |  106    |  20     ----------------------------<  93     3.7     |  27     |  0.69     Ca    8.8        02 Apr 2017 08:24      PT/INR - ( 02 Apr 2017 08:24 )   PT: 11.1 sec;   INR: 1.02 ratio         PTT - ( 02 Apr 2017 08:24 )  PTT:33.2 sec  Hemoglobin A1C, Whole Blood: 5.7 % (03-20 @ 20:45)      CAPILLARY BLOOD GLUCOSE    Cultures          RADIOLOGY & ADDITIONAL TESTS:      Care Discussed with [] Consultants  [x ] Patient  [x ] Family  []   /   [ ] Other; RN  DVT prophylaxis [ ] lovenox   [ ] subq heparin  [ ] coumadin  [ ] venodynes [ ] ambulating frequently at how risk for vte and no pharm         or  mechanical prophylaxis required    [ ] other   Advanced directive:    [ ]pt has hcp     [ ] pt declined to assign hcp  Discussed with pt @ bedside

## 2017-04-02 NOTE — PROGRESS NOTE ADULT - PROBLEM SELECTOR PLAN 2
bilateral knee oa  stable doing well with pt  patient seen walking the hallway today  OR tomorrow for left TKR  patient medically optimized for surgery in AM

## 2017-04-02 NOTE — PROGRESS NOTE ADULT - PROBLEM SELECTOR PLAN 3
hx of essential htn   bp well controlled now likely lower due to pain meds  will continue to hold dyazide

## 2017-04-02 NOTE — PROGRESS NOTE ADULT - ASSESSMENT
76F s/p R TKA POD 6  Analgesia  DVT ppx  WBAT  PT/OT    IVF/NPO after midnight  Medically optimized for L TKA tomorrow 4/3

## 2017-04-03 LAB
HCT VFR BLD CALC: 32.6 % — LOW (ref 34.5–45)
HGB BLD-MCNC: 10.3 G/DL — LOW (ref 11.5–15.5)

## 2017-04-03 PROCEDURE — 88311 DECALCIFY TISSUE: CPT | Mod: 26

## 2017-04-03 PROCEDURE — 88305 TISSUE EXAM BY PATHOLOGIST: CPT | Mod: 26

## 2017-04-03 PROCEDURE — 73562 X-RAY EXAM OF KNEE 3: CPT | Mod: 26,LT

## 2017-04-03 RX ORDER — SODIUM CHLORIDE 9 MG/ML
1000 INJECTION, SOLUTION INTRAVENOUS
Qty: 0 | Refills: 0 | Status: DISCONTINUED | OUTPATIENT
Start: 2017-04-03 | End: 2017-04-03

## 2017-04-03 RX ORDER — MORPHINE SULFATE 50 MG/1
2 CAPSULE, EXTENDED RELEASE ORAL EVERY 4 HOURS
Qty: 0 | Refills: 0 | Status: DISCONTINUED | OUTPATIENT
Start: 2017-04-03 | End: 2017-04-06

## 2017-04-03 RX ORDER — CELECOXIB 200 MG/1
200 CAPSULE ORAL
Qty: 0 | Refills: 0 | Status: DISCONTINUED | OUTPATIENT
Start: 2017-04-03 | End: 2017-04-06

## 2017-04-03 RX ORDER — FOLIC ACID 0.8 MG
1 TABLET ORAL DAILY
Qty: 0 | Refills: 0 | Status: DISCONTINUED | OUTPATIENT
Start: 2017-04-03 | End: 2017-04-06

## 2017-04-03 RX ORDER — SODIUM CHLORIDE 9 MG/ML
1000 INJECTION, SOLUTION INTRAVENOUS
Qty: 0 | Refills: 0 | Status: DISCONTINUED | OUTPATIENT
Start: 2017-04-03 | End: 2017-04-04

## 2017-04-03 RX ORDER — BUPIVACAINE 13.3 MG/ML
20 INJECTION, SUSPENSION, LIPOSOMAL INFILTRATION ONCE
Qty: 0 | Refills: 0 | Status: DISCONTINUED | OUTPATIENT
Start: 2017-04-03 | End: 2017-04-03

## 2017-04-03 RX ORDER — ASCORBIC ACID 60 MG
500 TABLET,CHEWABLE ORAL
Qty: 0 | Refills: 0 | Status: DISCONTINUED | OUTPATIENT
Start: 2017-04-03 | End: 2017-04-06

## 2017-04-03 RX ORDER — CEFAZOLIN SODIUM 1 G
1000 VIAL (EA) INJECTION EVERY 6 HOURS
Qty: 0 | Refills: 0 | Status: COMPLETED | OUTPATIENT
Start: 2017-04-03 | End: 2017-04-04

## 2017-04-03 RX ORDER — SERTRALINE 25 MG/1
50 TABLET, FILM COATED ORAL DAILY
Qty: 0 | Refills: 0 | Status: DISCONTINUED | OUTPATIENT
Start: 2017-04-03 | End: 2017-04-06

## 2017-04-03 RX ORDER — ASPIRIN/CALCIUM CARB/MAGNESIUM 324 MG
325 TABLET ORAL DAILY
Qty: 0 | Refills: 0 | Status: DISCONTINUED | OUTPATIENT
Start: 2017-04-04 | End: 2017-04-04

## 2017-04-03 RX ORDER — DORZOLAMIDE HYDROCHLORIDE TIMOLOL MALEATE 20; 5 MG/ML; MG/ML
1 SOLUTION/ DROPS OPHTHALMIC
Qty: 0 | Refills: 0 | Status: DISCONTINUED | OUTPATIENT
Start: 2017-04-03 | End: 2017-04-06

## 2017-04-03 RX ORDER — OXYCODONE HYDROCHLORIDE 5 MG/1
10 TABLET ORAL EVERY 6 HOURS
Qty: 0 | Refills: 0 | Status: DISCONTINUED | OUTPATIENT
Start: 2017-04-03 | End: 2017-04-03

## 2017-04-03 RX ORDER — ACETAMINOPHEN 500 MG
1000 TABLET ORAL ONCE
Qty: 0 | Refills: 0 | Status: COMPLETED | OUTPATIENT
Start: 2017-04-03 | End: 2017-04-03

## 2017-04-03 RX ORDER — HYDROMORPHONE HYDROCHLORIDE 2 MG/ML
0.5 INJECTION INTRAMUSCULAR; INTRAVENOUS; SUBCUTANEOUS
Qty: 0 | Refills: 0 | Status: DISCONTINUED | OUTPATIENT
Start: 2017-04-03 | End: 2017-04-03

## 2017-04-03 RX ORDER — HYDROMORPHONE HYDROCHLORIDE 2 MG/ML
4 INJECTION INTRAMUSCULAR; INTRAVENOUS; SUBCUTANEOUS EVERY 4 HOURS
Qty: 0 | Refills: 0 | Status: DISCONTINUED | OUTPATIENT
Start: 2017-04-03 | End: 2017-04-06

## 2017-04-03 RX ORDER — OXYCODONE HYDROCHLORIDE 5 MG/1
5 TABLET ORAL EVERY 4 HOURS
Qty: 0 | Refills: 0 | Status: DISCONTINUED | OUTPATIENT
Start: 2017-04-03 | End: 2017-04-03

## 2017-04-03 RX ORDER — LATANOPROST 0.05 MG/ML
1 SOLUTION/ DROPS OPHTHALMIC; TOPICAL AT BEDTIME
Qty: 0 | Refills: 0 | Status: DISCONTINUED | OUTPATIENT
Start: 2017-04-03 | End: 2017-04-06

## 2017-04-03 RX ORDER — FERROUS SULFATE 325(65) MG
325 TABLET ORAL
Qty: 0 | Refills: 0 | Status: DISCONTINUED | OUTPATIENT
Start: 2017-04-03 | End: 2017-04-06

## 2017-04-03 RX ORDER — POLYETHYLENE GLYCOL 3350 17 G/17G
17 POWDER, FOR SOLUTION ORAL DAILY
Qty: 0 | Refills: 0 | Status: DISCONTINUED | OUTPATIENT
Start: 2017-04-03 | End: 2017-04-06

## 2017-04-03 RX ORDER — DOCUSATE SODIUM 100 MG
100 CAPSULE ORAL THREE TIMES A DAY
Qty: 0 | Refills: 0 | Status: DISCONTINUED | OUTPATIENT
Start: 2017-04-03 | End: 2017-04-06

## 2017-04-03 RX ORDER — HYDROMORPHONE HYDROCHLORIDE 2 MG/ML
2 INJECTION INTRAMUSCULAR; INTRAVENOUS; SUBCUTANEOUS EVERY 4 HOURS
Qty: 0 | Refills: 0 | Status: DISCONTINUED | OUTPATIENT
Start: 2017-04-03 | End: 2017-04-06

## 2017-04-03 RX ORDER — ONDANSETRON 8 MG/1
4 TABLET, FILM COATED ORAL ONCE
Qty: 0 | Refills: 0 | Status: DISCONTINUED | OUTPATIENT
Start: 2017-04-03 | End: 2017-04-06

## 2017-04-03 RX ORDER — HYDROMORPHONE HYDROCHLORIDE 2 MG/ML
0.5 INJECTION INTRAMUSCULAR; INTRAVENOUS; SUBCUTANEOUS
Qty: 0 | Refills: 0 | Status: DISCONTINUED | OUTPATIENT
Start: 2017-04-03 | End: 2017-04-06

## 2017-04-03 RX ORDER — ACETAMINOPHEN 500 MG
1000 TABLET ORAL ONCE
Qty: 0 | Refills: 0 | Status: COMPLETED | OUTPATIENT
Start: 2017-04-04 | End: 2017-04-04

## 2017-04-03 RX ADMIN — HYDROMORPHONE HYDROCHLORIDE 4 MILLIGRAM(S): 2 INJECTION INTRAMUSCULAR; INTRAVENOUS; SUBCUTANEOUS at 23:35

## 2017-04-03 RX ADMIN — HYDROMORPHONE HYDROCHLORIDE 2 MILLIGRAM(S): 2 INJECTION INTRAMUSCULAR; INTRAVENOUS; SUBCUTANEOUS at 04:32

## 2017-04-03 RX ADMIN — HYDROMORPHONE HYDROCHLORIDE 4 MILLIGRAM(S): 2 INJECTION INTRAMUSCULAR; INTRAVENOUS; SUBCUTANEOUS at 18:38

## 2017-04-03 RX ADMIN — SODIUM CHLORIDE 100 MILLILITER(S): 9 INJECTION, SOLUTION INTRAVENOUS at 15:47

## 2017-04-03 RX ADMIN — Medication 500 MILLIGRAM(S): at 21:25

## 2017-04-03 RX ADMIN — Medication 100 MILLIGRAM(S): at 18:39

## 2017-04-03 RX ADMIN — HYDROMORPHONE HYDROCHLORIDE 4 MILLIGRAM(S): 2 INJECTION INTRAMUSCULAR; INTRAVENOUS; SUBCUTANEOUS at 22:45

## 2017-04-03 RX ADMIN — HYDROMORPHONE HYDROCHLORIDE 2 MILLIGRAM(S): 2 INJECTION INTRAMUSCULAR; INTRAVENOUS; SUBCUTANEOUS at 05:30

## 2017-04-03 RX ADMIN — CELECOXIB 200 MILLIGRAM(S): 200 CAPSULE ORAL at 21:25

## 2017-04-03 RX ADMIN — CELECOXIB 200 MILLIGRAM(S): 200 CAPSULE ORAL at 23:35

## 2017-04-03 RX ADMIN — Medication 1000 MILLIGRAM(S): at 22:50

## 2017-04-03 RX ADMIN — HYDROMORPHONE HYDROCHLORIDE 0.5 MILLIGRAM(S): 2 INJECTION INTRAMUSCULAR; INTRAVENOUS; SUBCUTANEOUS at 16:26

## 2017-04-03 RX ADMIN — DORZOLAMIDE HYDROCHLORIDE TIMOLOL MALEATE 1 DROP(S): 20; 5 SOLUTION/ DROPS OPHTHALMIC at 05:11

## 2017-04-03 RX ADMIN — HYDROMORPHONE HYDROCHLORIDE 0.5 MILLIGRAM(S): 2 INJECTION INTRAMUSCULAR; INTRAVENOUS; SUBCUTANEOUS at 16:09

## 2017-04-03 RX ADMIN — LATANOPROST 1 DROP(S): 0.05 SOLUTION/ DROPS OPHTHALMIC; TOPICAL at 22:45

## 2017-04-03 RX ADMIN — Medication 400 MILLIGRAM(S): at 21:26

## 2017-04-03 RX ADMIN — Medication 100 MILLIGRAM(S): at 22:45

## 2017-04-03 RX ADMIN — DORZOLAMIDE HYDROCHLORIDE TIMOLOL MALEATE 1 DROP(S): 20; 5 SOLUTION/ DROPS OPHTHALMIC at 21:25

## 2017-04-03 NOTE — PROGRESS NOTE ADULT - SUBJECTIVE AND OBJECTIVE BOX
Pt S&E. Pain controlled.  AVSS  Gen: NAD  LLE:  Dsg c/d/i  SILT L2-S1  +EHL/FHL/TA/Gastroc  DP+  Soft compartments, - calf ttp

## 2017-04-03 NOTE — BRIEF OPERATIVE NOTE - PROCEDURE
Total knee arthroplasty  04/03/2017    Active  ANALILIA
Total knee arthroplasty  03/27/2017  Right TKA  Active  [id number]

## 2017-04-03 NOTE — BRIEF OPERATIVE NOTE - PRE-OP DX
Knee osteoarthritis  04/03/2017    Active  Kiersten De La Torre  Primary osteoarthritis of right knee  03/27/2017    Active  Constantine Mondragon
Primary osteoarthritis of right knee  03/27/2017    Active  Constantine Mondragon

## 2017-04-03 NOTE — PROGRESS NOTE ADULT - SUBJECTIVE AND OBJECTIVE BOX
Patient is a 76y old  Female who presents with a chief complaint of Pt states " I am having a RIGHT Knee Replacement....(pt notes she is then scheduled for left knee replacement week later) (27 Mar 2017 07:36)  feels well, no complaints      INTERVAL HPI/OVERNIGHT EVENTS: no overnight events  T(C): 36.6, Max: 36.8 (04-02 @ 20:55)  HR: 69 (62 - 71)  BP: 130/83 (118/78 - 150/87)  RR: 16 (16 - 16)  SpO2: 99% (97% - 100%)  Wt(kg): --  I&O's Summary    I & Os for current day (as of 03 Apr 2017 11:01)  =============================================  IN: 1100 ml / OUT: 600 ml / NET: 500 ml      LABS:                        10.3   7.0   )-----------( 246      ( 02 Apr 2017 08:24 )             31.0     02 Apr 2017 08:24    142    |  106    |  20     ----------------------------<  93     3.7     |  27     |  0.69     Ca    8.8        02 Apr 2017 08:24      PT/INR - ( 02 Apr 2017 08:24 )   PT: 11.1 sec;   INR: 1.02 ratio         PTT - ( 02 Apr 2017 08:24 )  PTT:33.2 sec    CAPILLARY BLOOD GLUCOSE            MEDICATIONS  (STANDING):  celecoxib 200milliGRAM(s) Oral two times a day after meals  docusate sodium 100milliGRAM(s) Oral three times a day  ferrous    sulfate 325milliGRAM(s) Oral three times a day with meals  folic acid 1milliGRAM(s) Oral daily  multivitamin 1Tablet(s) Oral daily  ascorbic acid 500milliGRAM(s) Oral two times a day  sertraline 50milliGRAM(s) Oral daily  dorzolamide 2%/timolol 0.5% Ophthalmic Solution 1Drop(s) Both EYES two times a day  latanoprost 0.005% Ophthalmic Solution 1Drop(s) Both EYES at bedtime  lactated ringers. 1000milliLiter(s) IV Continuous <Continuous>  ceFAZolin   IVPB 2000milliGRAM(s) IV Intermittent once  BUpivacaine liposome 1.3% Injectable 20milliLiter(s) Local Injection once    MEDICATIONS  (PRN):  ondansetron Injectable 4milliGRAM(s) IV Push every 6 hours PRN Nausea and/or Vomiting  HYDROmorphone   Tablet 2milliGRAM(s) Oral every 4 hours PRN Moderate Pain (4 - 6)  HYDROmorphone   Tablet 4milliGRAM(s) Oral every 4 hours PRN Severe Pain (7 - 10)  morphine  - Injectable 2milliGRAM(s) IV Push every 4 hours PRN Severe Pain (7 - 10)  polyethylene glycol 3350 17Gram(s) Oral daily PRN Constipation      REVIEW OF SYSTEMS:  CONSTITUTIONAL: No fever, weight loss, or fatigue  EYES: No eye pain, visual disturbances, or discharge  ENMT:  No difficulty hearing, tinnitus, vertigo; No sinus or throat pain  NECK: No pain or stiffness  RESPIRATORY: No cough, wheezing, chills or hemoptysis; No shortness of breath  CARDIOVASCULAR: No chest pain, palpitations, dizziness, or leg swelling  GASTROINTESTINAL: No abdominal or epigastric pain. No nausea, vomiting, or hematemesis; No diarrhea or constipation. No melena or hematochezia.  GENITOURINARY: No dysuria, frequency, hematuria, or incontinence  NEUROLOGICAL: No headaches, memory loss, loss of strength, numbness, or tremors  SKIN: No itching, burning, rashes, or lesions   LYMPH NODES: No enlarged glands  ENDOCRINE: No heat or cold intolerance; No hair loss  MUSCULOSKELETAL: No joint pain or swelling; No muscle, back, or extremity pain  PSYCHIATRIC: No depression, anxiety, mood swings, or difficulty sleeping  HEME/LYMPH: No easy bruising, or bleeding gums  ALLERY AND IMMUNOLOGIC: No hives or eczema    RADIOLOGY & ADDITIONAL TESTS:    Imaging Personally Reviewed:  [X] YES  [ ] NO    Consultant(s) Notes Reviewed:  [X ] YES  [ ] NO    PHYSICAL EXAM:  GENERAL: NAD, well-groomed, well-developed  HEAD:  Atraumatic, Normocephalic  EYES: EOMI, PERRLA, conjunctiva and sclera clear  ENMT: No tonsillar erythema, exudates, or enlargement; Moist mucous membranes, Good dentition, No lesions  NECK: Supple, No JVD, Normal thyroid  NERVOUS SYSTEM:  Alert & Oriented X3, Good concentration; Motor Strength 5/5 B/L upper and lower extremities; DTRs 2+ intact and symmetric  CHEST/LUNG: Clear to percussion bilaterally; No rales, rhonchi, wheezing, or rubs  HEART: Regular rate and rhythm; No murmurs, rubs, or gallops  ABDOMEN: Soft, Nontender, Nondistended; Bowel sounds present  EXTREMITIES:  2+ Peripheral Pulses, No clubbing, cyanosis, or edema  LYMPH: No lymphadenopathy noted  SKIN: No rashes or lesions    Care Discussed with Consultants/Other Providers [ ] YES  [ ] NO

## 2017-04-03 NOTE — PROGRESS NOTE ADULT - SUBJECTIVE AND OBJECTIVE BOX
ROSALVA WERNER      76y   female  MRN-[id number]      ORTHOPEDIC SURGERY / DR. RUIZ    POD # 7    Vital Signs Last 24 Hrs  T(C): 36.6, Max: 36.8 (04-02 @ 20:55)  T(F): 97.8, Max: 98.3 (04-02 @ 20:55)  HR: 69 (62 - 71)  BP: 130/83 (118/78 - 150/87)  BP(mean): --  RR: 16 (16 - 16)  SpO2: 99% (97% - 100%)      RIGHT KNEE :                   DRESSING DRY AND INTACT                                           EDEMA RIGHT KNEE AND LEG                                           GOOD MOTOR TO  RIGHT LOWER EXTREMITY                                            NEURO-VASCULAR STATUS INTACT                                                                                       04/01/2017 BILAT LOWER EXT DOPPLER: NO DVT                                                             04/02/2017                                              HGB: 10.3                                           HCT : 31.0                                            PT/ INR   11.1/1.02                                            , .K 3.7, GLUCOSE 93, BUN 20, CREAT 0.69, , CO2 27                                                                                                                     TYPE AND  SCREEN DONE                                              MEDICAL CLEARANCE DONE                                                                                        ASSESSMENT &  PLAN:  POD # 7 S/P  RIGHT TOTAL KNEE REPLACEMENT                                               WEIGHT BEARING AS TOLERATED, OOB AND AMBULATE, PT, HOLD LOVENOX TODAY                                             TO OR TODAY FOR A LEFT TOTAL KNEE REPLACEMENT                                                     DISCHARGE PLANNING TO  REHAB

## 2017-04-04 LAB
ANION GAP SERPL CALC-SCNC: 8 MMOL/L — SIGNIFICANT CHANGE UP (ref 5–17)
BUN SERPL-MCNC: 15 MG/DL — SIGNIFICANT CHANGE UP (ref 7–23)
CALCIUM SERPL-MCNC: 8.6 MG/DL — SIGNIFICANT CHANGE UP (ref 8.5–10.1)
CHLORIDE SERPL-SCNC: 104 MMOL/L — SIGNIFICANT CHANGE UP (ref 96–108)
CO2 SERPL-SCNC: 28 MMOL/L — SIGNIFICANT CHANGE UP (ref 22–31)
CREAT SERPL-MCNC: 0.81 MG/DL — SIGNIFICANT CHANGE UP (ref 0.5–1.3)
GLUCOSE SERPL-MCNC: 97 MG/DL — SIGNIFICANT CHANGE UP (ref 70–99)
HCT VFR BLD CALC: 29 % — LOW (ref 34.5–45)
HGB BLD-MCNC: 9.7 G/DL — LOW (ref 11.5–15.5)
POTASSIUM SERPL-MCNC: 4.3 MMOL/L — SIGNIFICANT CHANGE UP (ref 3.5–5.3)
POTASSIUM SERPL-SCNC: 4.3 MMOL/L — SIGNIFICANT CHANGE UP (ref 3.5–5.3)
SODIUM SERPL-SCNC: 140 MMOL/L — SIGNIFICANT CHANGE UP (ref 135–145)

## 2017-04-04 RX ORDER — ASPIRIN/CALCIUM CARB/MAGNESIUM 324 MG
325 TABLET ORAL ONCE
Qty: 0 | Refills: 0 | Status: COMPLETED | OUTPATIENT
Start: 2017-04-04 | End: 2017-04-04

## 2017-04-04 RX ORDER — ASPIRIN/CALCIUM CARB/MAGNESIUM 324 MG
325 TABLET ORAL
Qty: 0 | Refills: 0 | Status: DISCONTINUED | OUTPATIENT
Start: 2017-04-04 | End: 2017-04-06

## 2017-04-04 RX ADMIN — Medication 1 MILLIGRAM(S): at 11:14

## 2017-04-04 RX ADMIN — HYDROMORPHONE HYDROCHLORIDE 4 MILLIGRAM(S): 2 INJECTION INTRAMUSCULAR; INTRAVENOUS; SUBCUTANEOUS at 20:37

## 2017-04-04 RX ADMIN — CELECOXIB 200 MILLIGRAM(S): 200 CAPSULE ORAL at 08:15

## 2017-04-04 RX ADMIN — Medication 325 MILLIGRAM(S): at 17:27

## 2017-04-04 RX ADMIN — Medication 500 MILLIGRAM(S): at 06:01

## 2017-04-04 RX ADMIN — HYDROMORPHONE HYDROCHLORIDE 4 MILLIGRAM(S): 2 INJECTION INTRAMUSCULAR; INTRAVENOUS; SUBCUTANEOUS at 11:11

## 2017-04-04 RX ADMIN — Medication 325 MILLIGRAM(S): at 11:52

## 2017-04-04 RX ADMIN — DORZOLAMIDE HYDROCHLORIDE TIMOLOL MALEATE 1 DROP(S): 20; 5 SOLUTION/ DROPS OPHTHALMIC at 17:27

## 2017-04-04 RX ADMIN — HYDROMORPHONE HYDROCHLORIDE 4 MILLIGRAM(S): 2 INJECTION INTRAMUSCULAR; INTRAVENOUS; SUBCUTANEOUS at 06:41

## 2017-04-04 RX ADMIN — CELECOXIB 200 MILLIGRAM(S): 200 CAPSULE ORAL at 18:01

## 2017-04-04 RX ADMIN — Medication 100 MILLIGRAM(S): at 06:03

## 2017-04-04 RX ADMIN — Medication 325 MILLIGRAM(S): at 08:15

## 2017-04-04 RX ADMIN — HYDROMORPHONE HYDROCHLORIDE 4 MILLIGRAM(S): 2 INJECTION INTRAMUSCULAR; INTRAVENOUS; SUBCUTANEOUS at 16:18

## 2017-04-04 RX ADMIN — Medication 100 MILLIGRAM(S): at 06:01

## 2017-04-04 RX ADMIN — Medication 100 MILLIGRAM(S): at 23:11

## 2017-04-04 RX ADMIN — HYDROMORPHONE HYDROCHLORIDE 4 MILLIGRAM(S): 2 INJECTION INTRAMUSCULAR; INTRAVENOUS; SUBCUTANEOUS at 02:16

## 2017-04-04 RX ADMIN — Medication 100 MILLIGRAM(S): at 13:12

## 2017-04-04 RX ADMIN — SERTRALINE 50 MILLIGRAM(S): 25 TABLET, FILM COATED ORAL at 11:14

## 2017-04-04 RX ADMIN — DORZOLAMIDE HYDROCHLORIDE TIMOLOL MALEATE 1 DROP(S): 20; 5 SOLUTION/ DROPS OPHTHALMIC at 06:01

## 2017-04-04 RX ADMIN — HYDROMORPHONE HYDROCHLORIDE 4 MILLIGRAM(S): 2 INJECTION INTRAMUSCULAR; INTRAVENOUS; SUBCUTANEOUS at 07:35

## 2017-04-04 RX ADMIN — LATANOPROST 1 DROP(S): 0.05 SOLUTION/ DROPS OPHTHALMIC; TOPICAL at 23:12

## 2017-04-04 RX ADMIN — Medication 100 MILLIGRAM(S): at 02:17

## 2017-04-04 RX ADMIN — Medication 1 TABLET(S): at 11:14

## 2017-04-04 RX ADMIN — HYDROMORPHONE HYDROCHLORIDE 4 MILLIGRAM(S): 2 INJECTION INTRAMUSCULAR; INTRAVENOUS; SUBCUTANEOUS at 18:02

## 2017-04-04 RX ADMIN — HYDROMORPHONE HYDROCHLORIDE 4 MILLIGRAM(S): 2 INJECTION INTRAMUSCULAR; INTRAVENOUS; SUBCUTANEOUS at 12:00

## 2017-04-04 RX ADMIN — Medication 325 MILLIGRAM(S): at 17:29

## 2017-04-04 RX ADMIN — Medication 1000 MILLIGRAM(S): at 06:40

## 2017-04-04 RX ADMIN — CELECOXIB 200 MILLIGRAM(S): 200 CAPSULE ORAL at 09:00

## 2017-04-04 RX ADMIN — Medication 400 MILLIGRAM(S): at 06:01

## 2017-04-04 RX ADMIN — HYDROMORPHONE HYDROCHLORIDE 4 MILLIGRAM(S): 2 INJECTION INTRAMUSCULAR; INTRAVENOUS; SUBCUTANEOUS at 21:30

## 2017-04-04 RX ADMIN — HYDROMORPHONE HYDROCHLORIDE 4 MILLIGRAM(S): 2 INJECTION INTRAMUSCULAR; INTRAVENOUS; SUBCUTANEOUS at 03:34

## 2017-04-04 RX ADMIN — Medication 500 MILLIGRAM(S): at 17:27

## 2017-04-04 NOTE — PHYSICAL THERAPY INITIAL EVALUATION ADULT - MANUAL MUSCLE TESTING RESULTS, REHAB EVAL
except right knee NT/no strength deficits were identified
no strength deficits were identified/except bilateral knees NT

## 2017-04-04 NOTE — PROGRESS NOTE ADULT - ATTENDING COMMENTS
Pt indicated for left TKR. No interval changes
Pt seen, agree with plan
pt scehduled for TKR of the left knee on monday 4/3  pt is in optimal state for surgery may proceed   will follow along with you

## 2017-04-04 NOTE — PHYSICAL THERAPY INITIAL EVALUATION ADULT - PLANNED THERAPY INTERVENTIONS, PT EVAL
gait training/transfer training/bed mobility training/ROM
gait training/transfer training/bed mobility training/ROM

## 2017-04-04 NOTE — PROGRESS NOTE ADULT - PROBLEM SELECTOR PLAN 2
bilateral knee oa  stable doing well with pt  patient seen walking the hallway today  POD 1 left tkr

## 2017-04-04 NOTE — PHYSICAL THERAPY INITIAL EVALUATION ADULT - RANGE OF MOTION EXAMINATION, REHAB EVAL
no ROM deficits were identified/except right knee flexion 0-40 degrees
no ROM deficits were identified/except left knee flexion 0-80 degrees, right knee flexion 0-95 degrees

## 2017-04-04 NOTE — PROGRESS NOTE ADULT - SUBJECTIVE AND OBJECTIVE BOX
The Patient was interviewed and evaluated    Vital Signs Last 24 Hrs  T(C): 36.7, Max: 36.8 (04-03 @ 12:06)  T(F): 98.1, Max: 98.2 (04-03 @ 12:06)  HR: 63 (63 - 82)  BP: 107/66 (107/66 - 158/76)  BP(mean): --  RR: 16 (13 - 18)  SpO2: 93% (93% - 100%)    Evaluation:   Patient complains of bladder pain (history of cystitis witih chronic bladder pain)     (X ) No apparent complications or complaints regarding anesthesia care at this time  ( X) All questions were answered    Condition:  (X ) Stable      ( ) Guarded      ( ) Critical    Recommendations:  (X) None     ( ) Other: DEPARTMENT OF ANESTHESIA  POST ANESTHETIC EVALUATION    The Patient was interviewed and evaluated    Vital Signs Last 24 Hrs  T(C): 36.7, Max: 36.8 (04-03 @ 12:06)  T(F): 98.1, Max: 98.2 (04-03 @ 12:06)  HR: 63 (63 - 82)  BP: 107/66 (107/66 - 158/76)  BP(mean): --  RR: 16 (13 - 18)  SpO2: 93% (93% - 100%)    Evaluation:   Patient complains of bladder pain (history of cystitis witih chronic bladder pain)     (X ) No apparent complications or complaints regarding anesthesia care at this time  ( X) All questions were answered    Condition:  (X ) Stable      ( ) Guarded      ( ) Critical    Recommendations:  (X) None     ( ) Other:

## 2017-04-04 NOTE — PHYSICAL THERAPY INITIAL EVALUATION ADULT - GAIT TRAINING, PT EVAL
3-5 sessions: Amb 100' with Rolling Walker, Min Assist
3-5 sessions: Amb 100' with Rolling Walker, CG/Min Assist

## 2017-04-04 NOTE — PHYSICAL THERAPY INITIAL EVALUATION ADULT - IMPAIRED TRANSFERS: SIT/STAND, REHAB EVAL
decreased ROM/decreased strength/pain/impaired balance
impaired balance/decreased strength/pain/decreased ROM

## 2017-04-04 NOTE — PROGRESS NOTE ADULT - SUBJECTIVE AND OBJECTIVE BOX
DEBBIROSALVA ACOSTA 76 y  MRN-[id number]       ORTHOPEDIC SURGERY / DR. RUIZ    POD # 1 S/P LEFT TKR  POD # 8 S/P RIGHT TKR    BILATERAL KNEES :    DRESSING DRY AND INTACT  UNCHANGED EDEMA RIGHT KNEE/ LEG  GOOD MOTOR TO LEFT AND  RIGHT LOWER EXTREMITY  NEURO-VASCULAR STATUS INTACT  NO CALF TENDERNESS     POST OP:  Hemoglobin: 10.3 (04-03 @ 16:42)  Hematocrit: 32.6 (04-03 @ 16:42)      ASSESSMENT &  PLAN:  POD #  S/P STAGED BILATERAL TOTAL KNEE  REPLACEMENT    WEIGHT  BEARING AS TOLERATED, OOB AND AMBULATE, PT,  DVT PROPHYLAXIS  MG PO BID  INCENTIVE SPIROMETRY   DISCHARGE PLANNING TO  REHAB

## 2017-04-04 NOTE — PROGRESS NOTE ADULT - SUBJECTIVE AND OBJECTIVE BOX
Patient is a 76y old  Female who presents with a chief complaint of Pt states " I am having a RIGHT Knee Replacement....(pt notes she is then scheduled for left knee replacement week later) (27 Mar 2017 07:36)  now pod 1 ltkr    INTERVAL HPI/OVERNIGHT EVENTS: no complaints, feels well  T(C): 36.9, Max: 36.9 (04-04 @ 13:05)  HR: 68 (63 - 77)  BP: 112/68 (107/66 - 127/69)  RR: 16 (16 - 16)  SpO2: 98% (93% - 100%)  Wt(kg): --  I&O's Summary    I & Os for current day (as of 04 Apr 2017 19:12)  =============================================  IN: 1085 ml / OUT: 950 ml / NET: 135 ml      LABS:                        9.7    x     )-----------( x        ( 04 Apr 2017 09:01 )             29.0     04 Apr 2017 09:01    140    |  104    |  15     ----------------------------<  97     4.3     |  28     |  0.81     Ca    8.6        04 Apr 2017 09:01          MEDICATIONS  (STANDING):  celecoxib 200milliGRAM(s) Oral two times a day after meals  docusate sodium 100milliGRAM(s) Oral three times a day  ferrous    sulfate 325milliGRAM(s) Oral three times a day with meals  folic acid 1milliGRAM(s) Oral daily  multivitamin 1Tablet(s) Oral daily  ascorbic acid 500milliGRAM(s) Oral two times a day  sertraline 50milliGRAM(s) Oral daily  dorzolamide 2%/timolol 0.5% Ophthalmic Solution 1Drop(s) Both EYES two times a day  latanoprost 0.005% Ophthalmic Solution 1Drop(s) Both EYES at bedtime  aspirin enteric coated 325milliGRAM(s) Oral two times a day    MEDICATIONS  (PRN):  HYDROmorphone  Injectable 0.5milliGRAM(s) IV Push every 10 minutes PRN Severe Pain (7 - 10)  ondansetron Injectable 4milliGRAM(s) IV Push once PRN Nausea and/or Vomiting  HYDROmorphone   Tablet 2milliGRAM(s) Oral every 4 hours PRN Moderate Pain (4 - 6)  HYDROmorphone   Tablet 4milliGRAM(s) Oral every 4 hours PRN Severe Pain (7 - 10)  morphine  - Injectable 2milliGRAM(s) IV Push every 4 hours PRN Severe Pain (7 - 10)  polyethylene glycol 3350 17Gram(s) Oral daily PRN Constipation      REVIEW OF SYSTEMS:  CONSTITUTIONAL: No fever, weight loss, or fatigue  EYES: No eye pain, visual disturbances, or discharge  ENMT:  No difficulty hearing, tinnitus, vertigo; No sinus or throat pain  NECK: No pain or stiffness  RESPIRATORY: No cough, wheezing, chills or hemoptysis; No shortness of breath  CARDIOVASCULAR: No chest pain, palpitations, dizziness, or leg swelling  GASTROINTESTINAL: No abdominal or epigastric pain. No nausea, vomiting, or hematemesis; No diarrhea or constipation. No melena or hematochezia.  GENITOURINARY: No dysuria, frequency, hematuria, or incontinence  NEUROLOGICAL: No headaches, memory loss, loss of strength, numbness, or tremors  SKIN: No itching, burning, rashes, or lesions   LYMPH NODES: No enlarged glands  ENDOCRINE: No heat or cold intolerance; No hair loss  MUSCULOSKELETAL: No joint pain or swelling; No muscle, back, or extremity pain  PSYCHIATRIC: No depression, anxiety, mood swings, or difficulty sleeping  HEME/LYMPH: No easy bruising, or bleeding gums  ALLERY AND IMMUNOLOGIC: No hives or eczema    RADIOLOGY & ADDITIONAL TESTS:    Imaging Personally Reviewed:  [X ] YES  [ ] NO    Consultant(s) Notes Reviewed:  [ ] YES  [ ] NO    PHYSICAL EXAM:  GENERAL: NAD, well-groomed, well-developed  HEAD:  Atraumatic, Normocephalic  EYES: EOMI, PERRLA, conjunctiva and sclera clear  ENMT: No tonsillar erythema, exudates, or enlargement; Moist mucous membranes, Good dentition, No lesions  NECK: Supple, No JVD, Normal thyroid  NERVOUS SYSTEM:  Alert & Oriented X3, Good concentration; Motor Strength 5/5 B/L upper and lower extremities; DTRs 2+ intact and symmetric  CHEST/LUNG: Clear to percussion bilaterally; No rales, rhonchi, wheezing, or rubs  HEART: Regular rate and rhythm; No murmurs, rubs, or gallops  ABDOMEN: Soft, Nontender, Nondistended; Bowel sounds present  EXTREMITIES:  2+ Peripheral Pulses, No clubbing, cyanosis, or edema  LYMPH: No lymphadenopathy noted  SKIN: No rashes or lesions    Care Discussed with Consultants/Other Providers [ ] YES  [ ] NO

## 2017-04-04 NOTE — PHYSICAL THERAPY INITIAL EVALUATION ADULT - GENERAL OBSERVATIONS, REHAB EVAL
Pt received in bed, NAD.
Pt received in bed, bilateral Jesus's in place, son present in room, NAD.  Pt scheduled for left TKR on 4/3/17.

## 2017-04-04 NOTE — PHYSICAL THERAPY INITIAL EVALUATION ADULT - CRITERIA FOR SKILLED THERAPEUTIC INTERVENTIONS
anticipated equipment needs at discharge/impairments found
anticipated equipment needs at discharge/impairments found

## 2017-04-04 NOTE — PHYSICAL THERAPY INITIAL EVALUATION ADULT - IMPAIRMENTS CONTRIBUTING TO GAIT DEVIATIONS, PT EVAL
decreased strength/decreased ROM/impaired balance/pain
decreased strength/decreased ROM/pain/impaired balance

## 2017-04-05 DIAGNOSIS — M17.0 BILATERAL PRIMARY OSTEOARTHRITIS OF KNEE: ICD-10-CM

## 2017-04-05 LAB
HCT VFR BLD CALC: 29.1 % — LOW (ref 34.5–45)
HGB BLD-MCNC: 9.5 G/DL — LOW (ref 11.5–15.5)
SURGICAL PATHOLOGY FINAL REPORT - CH: SIGNIFICANT CHANGE UP

## 2017-04-05 RX ORDER — ACETAMINOPHEN 500 MG
1000 TABLET ORAL ONCE
Qty: 0 | Refills: 0 | Status: COMPLETED | OUTPATIENT
Start: 2017-04-05 | End: 2017-04-05

## 2017-04-05 RX ADMIN — Medication 400 MILLIGRAM(S): at 08:08

## 2017-04-05 RX ADMIN — HYDROMORPHONE HYDROCHLORIDE 4 MILLIGRAM(S): 2 INJECTION INTRAMUSCULAR; INTRAVENOUS; SUBCUTANEOUS at 13:18

## 2017-04-05 RX ADMIN — Medication 100 MILLIGRAM(S): at 21:10

## 2017-04-05 RX ADMIN — HYDROMORPHONE HYDROCHLORIDE 4 MILLIGRAM(S): 2 INJECTION INTRAMUSCULAR; INTRAVENOUS; SUBCUTANEOUS at 23:20

## 2017-04-05 RX ADMIN — Medication 325 MILLIGRAM(S): at 18:10

## 2017-04-05 RX ADMIN — HYDROMORPHONE HYDROCHLORIDE 4 MILLIGRAM(S): 2 INJECTION INTRAMUSCULAR; INTRAVENOUS; SUBCUTANEOUS at 18:10

## 2017-04-05 RX ADMIN — MORPHINE SULFATE 2 MILLIGRAM(S): 50 CAPSULE, EXTENDED RELEASE ORAL at 05:11

## 2017-04-05 RX ADMIN — HYDROMORPHONE HYDROCHLORIDE 4 MILLIGRAM(S): 2 INJECTION INTRAMUSCULAR; INTRAVENOUS; SUBCUTANEOUS at 01:30

## 2017-04-05 RX ADMIN — LATANOPROST 1 DROP(S): 0.05 SOLUTION/ DROPS OPHTHALMIC; TOPICAL at 21:10

## 2017-04-05 RX ADMIN — CELECOXIB 200 MILLIGRAM(S): 200 CAPSULE ORAL at 08:08

## 2017-04-05 RX ADMIN — Medication 500 MILLIGRAM(S): at 04:37

## 2017-04-05 RX ADMIN — Medication 325 MILLIGRAM(S): at 11:46

## 2017-04-05 RX ADMIN — Medication 325 MILLIGRAM(S): at 04:39

## 2017-04-05 RX ADMIN — HYDROMORPHONE HYDROCHLORIDE 4 MILLIGRAM(S): 2 INJECTION INTRAMUSCULAR; INTRAVENOUS; SUBCUTANEOUS at 22:22

## 2017-04-05 RX ADMIN — Medication 100 MILLIGRAM(S): at 12:50

## 2017-04-05 RX ADMIN — HYDROMORPHONE HYDROCHLORIDE 4 MILLIGRAM(S): 2 INJECTION INTRAMUSCULAR; INTRAVENOUS; SUBCUTANEOUS at 12:48

## 2017-04-05 RX ADMIN — DORZOLAMIDE HYDROCHLORIDE TIMOLOL MALEATE 1 DROP(S): 20; 5 SOLUTION/ DROPS OPHTHALMIC at 04:38

## 2017-04-05 RX ADMIN — CELECOXIB 200 MILLIGRAM(S): 200 CAPSULE ORAL at 18:10

## 2017-04-05 RX ADMIN — Medication 100 MILLIGRAM(S): at 04:38

## 2017-04-05 RX ADMIN — Medication 500 MILLIGRAM(S): at 18:11

## 2017-04-05 RX ADMIN — DORZOLAMIDE HYDROCHLORIDE TIMOLOL MALEATE 1 DROP(S): 20; 5 SOLUTION/ DROPS OPHTHALMIC at 18:19

## 2017-04-05 RX ADMIN — Medication 1 MILLIGRAM(S): at 11:46

## 2017-04-05 RX ADMIN — Medication 1 TABLET(S): at 11:46

## 2017-04-05 RX ADMIN — Medication 325 MILLIGRAM(S): at 08:08

## 2017-04-05 RX ADMIN — MORPHINE SULFATE 2 MILLIGRAM(S): 50 CAPSULE, EXTENDED RELEASE ORAL at 04:37

## 2017-04-05 RX ADMIN — SERTRALINE 50 MILLIGRAM(S): 25 TABLET, FILM COATED ORAL at 11:46

## 2017-04-05 RX ADMIN — HYDROMORPHONE HYDROCHLORIDE 4 MILLIGRAM(S): 2 INJECTION INTRAMUSCULAR; INTRAVENOUS; SUBCUTANEOUS at 00:38

## 2017-04-05 NOTE — DISCHARGE NOTE ADULT - PATIENT PORTAL LINK FT
“You can access the [employer] Patient Portal, offered by [facility], by registering with the following website: [email or web address]”

## 2017-04-05 NOTE — DISCHARGE NOTE ADULT - CARE PLAN
Principal Discharge DX:	Osteoarthritis of both knees  Goal:	PHYSICAL THERAPY  Instructions for follow-up, activity and diet:	WEIGHT BEARING AS TOLERATED.  FOLLOW UP WITH DR. RUIZ AFTER REHAB [phone].  MAY SHOWER AS OF MONDAY 04/10/2017.  KEEP KNEE AQUACEL  DRESSING  ON DRY AND CLEAN, MAY REMOVE AFTER  04/15/2017  Secondary Diagnosis:	Anxiety and depression  Secondary Diagnosis:	Glaucoma  Secondary Diagnosis:	Hypertension Principal Discharge DX:	Osteoarthritis of both knees  Goal:	PHYSICAL THERAPY  Instructions for follow-up, activity and diet:	WEIGHT BEARING AS TOLERATED.  FOLLOW UP WITH DR. RUIZ AFTER REHAB [phone].  MAY SHOWER AS OF MONDAY 04/10/2017.  KEEP KNEE AQUACEL  DRESSING  ON DRY AND CLEAN, MAY REMOVE AFTER  04/15/2017  Secondary Diagnosis:	Anxiety and depression  Secondary Diagnosis:	Glaucoma  Secondary Diagnosis:	Hypertension Principal Discharge DX:	Osteoarthritis of both knees  Goal:	PHYSICAL THERAPY  Instructions for follow-up, activity and diet:	WEIGHT BEARING AS TOLERATED.  FOLLOW UP WITH DR. RUIZ AFTER REHAB [phone].  MAY SHOWER AS OF MONDAY 04/10/2017.  KEEP KNEE AQUACEL  DRESSING  ON DRY AND CLEAN, MAY REMOVE AFTER  04/15/2017  Secondary Diagnosis:	Anxiety and depression  Secondary Diagnosis:	Glaucoma  Secondary Diagnosis:	Hypertension Principal Discharge DX:	Osteoarthritis of both knees  Goal:	PHYSICAL THERAPY  Instructions for follow-up, activity and diet:	WEIGHT BEARING AS TOLERATED.  FOLLOW UP WITH DR. RUIZ AFTER REHAB [phone].  MAY SHOWER AS OF MONDAY 04/10/2017.  KEEP KNEE AQUACEL  DRESSING  ON DRY AND CLEAN, MAY REMOVE AFTER  04/15/2017  Secondary Diagnosis:	Anxiety and depression  Secondary Diagnosis:	Glaucoma  Secondary Diagnosis:	Hypertension

## 2017-04-05 NOTE — DISCHARGE NOTE ADULT - PLAN OF CARE
PHYSICAL THERAPY WEIGHT BEARING AS TOLERATED.  FOLLOW UP WITH DR. RUIZ AFTER REHAB [phone].  MAY SHOWER AS OF MONDAY 04/10/2017.  KEEP KNEE AQUACEL  DRESSING  ON DRY AND CLEAN, MAY REMOVE AFTER  04/15/2017

## 2017-04-05 NOTE — DISCHARGE NOTE ADULT - MEDICATION SUMMARY - MEDICATIONS TO TAKE
I will START or STAY ON the medications listed below when I get home from the hospital:    celecoxib 200 mg oral capsule  -- 1 cap(s) by mouth 2 times a day (after meals)  -- Indication: For PAIN    HYDROmorphone 2 mg oral tablet  -- 1 tab(s) by mouth every 4 hours, As needed, Moderate Pain (4 - 6)  -- Indication: For PAIN    HYDROmorphone 4 mg oral tablet  -- 1 tab(s) by mouth every 4 hours, As needed, Severe Pain (7 - 10)  -- Indication: For PAIN    aspirin 325 mg oral delayed release tablet  -- 1 tab(s) by mouth 2 times a day  -- Indication: For TO PREVENT BLOOD CLOT     sertraline 50 mg oral tablet  -- 1 tab(s) by mouth once a day (at bedtime)  -- Indication: For HOME MEDICATION     Dyazide 37.5 mg-25 mg oral capsule  -- 1 cap(s) by mouth once a day  -- Indication: For HOME MEDICATION      ferrous sulfate 325 mg (65 mg elemental iron) oral tablet  -- 1 tab(s) by mouth 2 times a day  -- Indication: For POST OP ANEMIA     docusate sodium 100 mg oral capsule  -- 1 cap(s) by mouth 3 times a day  -- Indication: For STOOL SOFTNER     Lumigan 0.01% ophthalmic solution  -- 1 drop(s) to each affected eye once a day (in the evening)  -- Indication: For HOME MEDICATION     dorzolamide-timolol 2%-0.5% preservative-free ophthalmic solution  -- 1 drop(s) to each affected eye 2 times a day  -- Indication: For HOME MEDICATION     Multiple Vitamins oral tablet  -- 1 tab(s) by mouth once a day  -- Indication: For VITAMIN    Calcium 600+D 600 mg-200 intl units oral tablet  -- 1 tab(s) by mouth once a day  -- Indication: For VITAMIN    Vitamin D3 1000 intl units oral capsule  -- 1 cap(s) by mouth once a day  -- Indication: For VITAMIN    ascorbic acid 500 mg oral tablet  -- 1 tab(s) by mouth 2 times a day  -- Indication: For VITAMIN    folic acid 1 mg oral tablet  -- 1 tab(s) by mouth once a day  -- Indication: For VITAMIN

## 2017-04-05 NOTE — DISCHARGE NOTE ADULT - MEDICATION SUMMARY - MEDICATIONS TO STOP TAKING
I will STOP taking the medications listed below when I get home from the hospital:    aspirin 81 mg oral tablet  -- 1 tab(s) by mouth every other day

## 2017-04-05 NOTE — DISCHARGE NOTE ADULT - VISION (WITH CORRECTIVE LENSES IF THE PATIENT USUALLY WEARS THEM):
Partially impaired: cannot see medication labels or newsprint, but can see obstacles in path, and the surrounding layout; can count fingers at arm's length/Wears RX Eyeglasses

## 2017-04-05 NOTE — PROGRESS NOTE ADULT - SUBJECTIVE AND OBJECTIVE BOX
ROSALVA WERNER 76y  MRN-[id number]       ORTHOPEDIC SURGERY / DR. RUIZ    Vital Signs Last 24 Hrs  T(C): 36.8, Max: 37.2 (04-04 @ 21:14)  T(F): 98.3, Max: 99 (04-04 @ 21:14)  HR: 73 (68 - 73)  BP: 134/73 (112/68 - 145/73)  BP(mean): --  RR: 16 (16 - 18)  SpO2: 100% (98% - 100%)    POD # 2 S/P LEFT TKR  POD # 9 S/P RIGHT TKR     BILATERAL  KNEES :    DRESSING DRY AND INTACT  UNCHANGED EDEMA   GOOD MOTOR TO BILATERAL LOWER  EXTREMITIES   NEURO-VASCULAR STATUS INTACT  NO CALF TENDERNESS      Hemoglobin: 9.7 (04-04 @ 09:01)  Hemoglobin: 10.3 (04-03 @ 16:42)  Hemoglobin: 10.3 (04-02 @ 08:24)    Hematocrit: 29.0 (04-04 @ 09:01)  Hematocrit: 32.6 (04-03 @ 16:42)  Hematocrit: 31.0 (04-02 @ 08:24)    04 Apr 2017 09:01    140    |  104    |  15     ----------------------------<  97     4.3     |  28     |  0.81     Ca    8.6        04 Apr 2017 09:01    ASSESSMENT &  PLAN:  S/P  STAGED  BILATERAL  TOTAL KNEE  REPLACEMENT    WEIGHT  BEARING AS TOLERATED, OOB AND AMBULATE, PT,  DVT PROPHYLAXIS  MG PO BID  INCENTIVE SPIROMETRY   DISCHARGE PLANNING TO  REHAB IN AM

## 2017-04-05 NOTE — PROGRESS NOTE ADULT - SUBJECTIVE AND OBJECTIVE BOX
Patient is a 76y old  Female who presents with a chief complaint of Pt states " I am having a RIGHT Knee Replacement....(pt notes she is then scheduled for left knee replacement week later) (27 Mar 2017 07:36)      INTERVAL HPI/OVERNIGHT EVENTS:    MEDICATIONS  (STANDING):  celecoxib 200milliGRAM(s) Oral two times a day after meals  docusate sodium 100milliGRAM(s) Oral three times a day  ferrous    sulfate 325milliGRAM(s) Oral three times a day with meals  folic acid 1milliGRAM(s) Oral daily  multivitamin 1Tablet(s) Oral daily  ascorbic acid 500milliGRAM(s) Oral two times a day  sertraline 50milliGRAM(s) Oral daily  dorzolamide 2%/timolol 0.5% Ophthalmic Solution 1Drop(s) Both EYES two times a day  latanoprost 0.005% Ophthalmic Solution 1Drop(s) Both EYES at bedtime  aspirin enteric coated 325milliGRAM(s) Oral two times a day    MEDICATIONS  (PRN):  HYDROmorphone  Injectable 0.5milliGRAM(s) IV Push every 10 minutes PRN Severe Pain (7 - 10)  ondansetron Injectable 4milliGRAM(s) IV Push once PRN Nausea and/or Vomiting  HYDROmorphone   Tablet 2milliGRAM(s) Oral every 4 hours PRN Moderate Pain (4 - 6)  HYDROmorphone   Tablet 4milliGRAM(s) Oral every 4 hours PRN Severe Pain (7 - 10)  morphine  - Injectable 2milliGRAM(s) IV Push every 4 hours PRN Severe Pain (7 - 10)  polyethylene glycol 3350 17Gram(s) Oral daily PRN Constipation      Allergies    latex (Rash)  No Known Drug Allergies    Intolerances        REVIEW OF SYSTEMS:  CONSTITUTIONAL: No fever, weight loss, or fatigue  EYES: No eye pain, visual disturbances  ENMT:  No difficulty hearing, tinnitus, vertigo; No sinus or throat pain  NECK: No pain or stiffness  RESPIRATORY: No cough, wheezing, chills or hemoptysis; No shortness of breath  CARDIOVASCULAR: No chest pain, palpitations, dizziness  GASTROINTESTINAL: No abdominal or epigastric pain. No nausea, vomiting, or hematemesis; No diarrhea or constipation. No melena or hematochezia.  GENITOURINARY: No dysuria, frequency, hematuria, or incontinence  NEUROLOGICAL: No headaches, memory loss, loss of strength, numbness, or tremors  SKIN: No itching, burning  LYMPH NODES: No enlarged glands  MUSCULOSKELETAL: No joint pain or swelling; No muscle, back, b/l knee pain  PSYCHIATRIC: No depression, mood swings  HEME/LYMPH: No easy bruising, or bleeding gums  ALLERGY AND IMMUNOLOGIC: No hives    Vital Signs Last 24 Hrs  T(C): 36.8, Max: 37.2 (04-04 @ 21:14)  T(F): 98.3, Max: 99 (04-04 @ 21:14)  HR: 73 (68 - 73)  BP: 134/73 (112/68 - 145/73)  BP(mean): --  RR: 16 (16 - 18)  SpO2: 100% (98% - 100%)    PHYSICAL EXAM:  GENERAL: NAD, well-groomed, well-developed  HEAD:  Atraumatic, Normocephalic  EYES: EOMI, PERRLA, conjunctiva and sclera clear  ENMT: No tonsillar erythema, exudates, or enlargement   NECK: Supple, No JVD  NERVOUS SYSTEM:  Alert & Oriented X3, Good concentration; Motor Strength 5/5 B/L upper and lower extremities; DTRs 2+ intact and symmetric  CHEST/LUNG: Clear to auscultation bilaterally; No rales, rhonchi, wheezing  HEART: Regular rate and rhythm  ABDOMEN: Soft, Nontender, Nondistended; Bowel sounds present  EXTREMITIES:  2+ Peripheral Pulses, no edema, b/l knees with clean dressing   LYMPH: No lymphadenopathy noted  SKIN: No rashes     LABS:                        9.5    x     )-----------( x        ( 05 Apr 2017 09:09 )             29.1       Ca    8.6        04 Apr 2017 09:01          RADIOLOGY & ADDITIONAL TESTS:    Imaging Personally Reviewed:  [ x] YES  [ ] NO    Consultant(s) Notes Reviewed:  [x ] YES  [ ] NO    Care Discussed with Consultants/Other Providers [ x] YES  [ ] NO

## 2017-04-05 NOTE — DISCHARGE NOTE ADULT - CARE PROVIDER_API CALL
Lyle Khan), Orthopaedic Surgery  [address] [place] [address]  Phone: [phone]  Fax: [phone]

## 2017-04-06 VITALS
OXYGEN SATURATION: 93 % | RESPIRATION RATE: 16 BRPM | DIASTOLIC BLOOD PRESSURE: 77 MMHG | TEMPERATURE: 99 F | SYSTOLIC BLOOD PRESSURE: 152 MMHG | HEART RATE: 87 BPM

## 2017-04-06 LAB
HCT VFR BLD CALC: 29.9 % — LOW (ref 34.5–45)
HGB BLD-MCNC: 9.6 G/DL — LOW (ref 11.5–15.5)
MCHC RBC-ENTMCNC: 31 PG — SIGNIFICANT CHANGE UP (ref 27–34)
MCHC RBC-ENTMCNC: 32.2 GM/DL — SIGNIFICANT CHANGE UP (ref 32–36)
MCV RBC AUTO: 96.3 FL — SIGNIFICANT CHANGE UP (ref 80–100)
PLATELET # BLD AUTO: 242 K/UL — SIGNIFICANT CHANGE UP (ref 150–400)
RBC # BLD: 3.1 M/UL — LOW (ref 3.8–5.2)
RBC # FLD: 13.5 % — SIGNIFICANT CHANGE UP (ref 10.3–14.5)
WBC # BLD: 9.7 K/UL — SIGNIFICANT CHANGE UP (ref 3.8–10.5)
WBC # FLD AUTO: 9.7 K/UL — SIGNIFICANT CHANGE UP (ref 3.8–10.5)

## 2017-04-06 PROCEDURE — 97110 THERAPEUTIC EXERCISES: CPT

## 2017-04-06 PROCEDURE — 97530 THERAPEUTIC ACTIVITIES: CPT

## 2017-04-06 PROCEDURE — 86900 BLOOD TYPING SEROLOGIC ABO: CPT

## 2017-04-06 PROCEDURE — 73562 X-RAY EXAM OF KNEE 3: CPT

## 2017-04-06 PROCEDURE — 80048 BASIC METABOLIC PNL TOTAL CA: CPT

## 2017-04-06 PROCEDURE — 85610 PROTHROMBIN TIME: CPT

## 2017-04-06 PROCEDURE — 86850 RBC ANTIBODY SCREEN: CPT

## 2017-04-06 PROCEDURE — 88305 TISSUE EXAM BY PATHOLOGIST: CPT

## 2017-04-06 PROCEDURE — C1776: CPT

## 2017-04-06 PROCEDURE — 85027 COMPLETE CBC AUTOMATED: CPT

## 2017-04-06 PROCEDURE — 97162 PT EVAL MOD COMPLEX 30 MIN: CPT

## 2017-04-06 PROCEDURE — C1889: CPT

## 2017-04-06 PROCEDURE — 88311 DECALCIFY TISSUE: CPT

## 2017-04-06 PROCEDURE — C1713: CPT

## 2017-04-06 PROCEDURE — 85018 HEMOGLOBIN: CPT

## 2017-04-06 PROCEDURE — 97116 GAIT TRAINING THERAPY: CPT

## 2017-04-06 PROCEDURE — 86920 COMPATIBILITY TEST SPIN: CPT

## 2017-04-06 PROCEDURE — 86901 BLOOD TYPING SEROLOGIC RH(D): CPT

## 2017-04-06 PROCEDURE — 93971 EXTREMITY STUDY: CPT

## 2017-04-06 PROCEDURE — 85730 THROMBOPLASTIN TIME PARTIAL: CPT

## 2017-04-06 RX ORDER — DOCUSATE SODIUM 100 MG
1 CAPSULE ORAL
Qty: 0 | Refills: 0 | COMMUNITY
Start: 2017-04-06

## 2017-04-06 RX ORDER — FOLIC ACID 0.8 MG
1 TABLET ORAL
Qty: 0 | Refills: 0 | COMMUNITY
Start: 2017-04-06

## 2017-04-06 RX ORDER — HYDROMORPHONE HYDROCHLORIDE 2 MG/ML
1 INJECTION INTRAMUSCULAR; INTRAVENOUS; SUBCUTANEOUS
Qty: 0 | Refills: 0 | COMMUNITY
Start: 2017-04-06

## 2017-04-06 RX ORDER — ASCORBIC ACID 60 MG
1 TABLET,CHEWABLE ORAL
Qty: 0 | Refills: 0 | COMMUNITY
Start: 2017-04-06

## 2017-04-06 RX ORDER — CELECOXIB 200 MG/1
1 CAPSULE ORAL
Qty: 0 | Refills: 0 | COMMUNITY
Start: 2017-04-06

## 2017-04-06 RX ORDER — ASPIRIN/CALCIUM CARB/MAGNESIUM 324 MG
1 TABLET ORAL
Qty: 0 | Refills: 0 | COMMUNITY
Start: 2017-04-06

## 2017-04-06 RX ORDER — ASPIRIN/CALCIUM CARB/MAGNESIUM 324 MG
1 TABLET ORAL
Qty: 0 | Refills: 0 | COMMUNITY

## 2017-04-06 RX ORDER — FERROUS SULFATE 325(65) MG
1 TABLET ORAL
Qty: 0 | Refills: 0 | COMMUNITY
Start: 2017-04-06

## 2017-04-06 RX ADMIN — Medication 1 TABLET(S): at 12:23

## 2017-04-06 RX ADMIN — Medication 100 MILLIGRAM(S): at 14:11

## 2017-04-06 RX ADMIN — Medication 100 MILLIGRAM(S): at 05:35

## 2017-04-06 RX ADMIN — Medication 325 MILLIGRAM(S): at 05:35

## 2017-04-06 RX ADMIN — DORZOLAMIDE HYDROCHLORIDE TIMOLOL MALEATE 1 DROP(S): 20; 5 SOLUTION/ DROPS OPHTHALMIC at 05:35

## 2017-04-06 RX ADMIN — HYDROMORPHONE HYDROCHLORIDE 4 MILLIGRAM(S): 2 INJECTION INTRAMUSCULAR; INTRAVENOUS; SUBCUTANEOUS at 09:47

## 2017-04-06 RX ADMIN — POLYETHYLENE GLYCOL 3350 17 GRAM(S): 17 POWDER, FOR SOLUTION ORAL at 12:22

## 2017-04-06 RX ADMIN — Medication 325 MILLIGRAM(S): at 12:23

## 2017-04-06 RX ADMIN — SERTRALINE 50 MILLIGRAM(S): 25 TABLET, FILM COATED ORAL at 12:23

## 2017-04-06 RX ADMIN — CELECOXIB 200 MILLIGRAM(S): 200 CAPSULE ORAL at 09:56

## 2017-04-06 RX ADMIN — Medication 500 MILLIGRAM(S): at 05:35

## 2017-04-06 RX ADMIN — Medication 1 MILLIGRAM(S): at 12:23

## 2017-04-06 RX ADMIN — Medication 325 MILLIGRAM(S): at 09:56

## 2017-04-06 NOTE — PROGRESS NOTE ADULT - SUBJECTIVE AND OBJECTIVE BOX
Patient is a 76y old  Female who presents with a chief complaint of BILATERAL KNEE PAIN  STAGED  BILATERAL TOTAL KNEE REPLACEMENT (05 Apr 2017 16:47)      INTERVAL HPI/OVERNIGHT EVENTS:    Vital Signs Last 24 Hrs  T(C): 36.9, Max: 36.9 (04-06 @ 05:28)  T(F): 98.5, Max: 98.5 (04-06 @ 05:28)  HR: 81 (69 - 81)  BP: 137/80 (118/72 - 137/80)  BP(mean): --  RR: 16 (16 - 16)  SpO2: 98% (95% - 98%)I&O's Summary    I & Os for current day (as of 06 Apr 2017 11:02)  =============================================  IN: 300 ml / OUT: 400 ml / NET: -100 ml      LABS:                        9.6    9.7   )-----------( 242      ( 06 Apr 2017 08:50 )             29.9       REVIEW OF SYSTEMS:  CONSTITUTIONAL: No fever, weight loss, or fatigue  NECK: No pain or stiffness  RESPIRATORY: No cough, wheezing, chills or hemoptysis; No shortness of breath  CARDIOVASCULAR: No chest pain, palpitations, dizziness, or leg swelling  GASTROINTESTINAL: No abdominal or epigastric pain. No nausea, vomiting, or hematemesis; No diarrhea or constipation. No melena or hematochezia.  GENITOURINARY: No dysuria, frequency, hematuria, or incontinence  NEUROLOGICAL: No headaches, memory loss, loss of strength, numbness, or tremors  SKIN: No itching, burning  MUSCULOSKELETAL: No joint pain or swelling; No muscle, back, or extremity pain  PSYCHIATRIC: No depression, mood swings, HEME/LYMPH: No easy bruising, or bleeding gums  ALLERY AND IMMUNOLOGIC: No hives     RADIOLOGY & ADDITIONAL TESTS:    Imaging Personally Reviewed:  [x ] YES  [ ] NO    Consultant(s) Notes Reviewed:  [x ] YES  [ ] NO    PHYSICAL EXAM:  GENERAL: NAD, well-groomed, well-developed  HEAD:  Atraumatic, Normocephalic  EYES: EOMI, PERRLA, conjunctiva and sclera clear  ENMT: No tonsillar erythema, exudates, or enlargement; Moist mucous membranes  NECK: Supple, No JVD  NERVOUS SYSTEM:  Alert & Oriented X3, Good concentration; Motor Strength 5/5 B/L upper and lower extremities; DTRs 2+ intact and symmetric  CHEST/LUNG: Clear to percussion bilaterally; No rales, rhonchi, wheezing,  HEART: Regular rate and rhythm  ABDOMEN: Soft, Nontender, Nondistended; Bowel sounds present  EXTREMITIES:  No clubbing, cyanosis, or edema  LYMPH: No lymphadenopathy noted  SKIN: No rashes    Care Discussed with Consultants/Other Providers [s ] YES  [ ] NO

## 2017-04-06 NOTE — PROGRESS NOTE ADULT - PROBLEM SELECTOR PROBLEM 4
Advanced care planning/counseling discussion
Advanced care planning/counseling discussion
Anemia due to blood loss

## 2017-04-06 NOTE — PROGRESS NOTE ADULT - PROBLEM SELECTOR PROBLEM 5
Osteoarthritis of both knees
Advanced care planning/counseling discussion
Osteoarthritis of both knees
Advanced care planning/counseling discussion

## 2017-04-06 NOTE — PROGRESS NOTE ADULT - PROBLEM SELECTOR PROBLEM 1
Anxiety and depression
Unilateral primary osteoarthritis, right knee
Anxiety and depression
Unilateral primary osteoarthritis, right knee
Anxiety and depression

## 2017-04-06 NOTE — PROGRESS NOTE ADULT - PROBLEM SELECTOR PROBLEM 2
Essential hypertension
Hypotension, unspecified hypotension type
Hypotension, unspecified hypotension type
Unilateral primary osteoarthritis, right knee

## 2017-04-06 NOTE — PROGRESS NOTE ADULT - PROBLEM SELECTOR PLAN 1
PST Labs; CBC, CMP, Coags, Type & Screen, Nose Culture for MRSA/MSSA, EKG - medical clearance needed - pre-op instructions as well as pre-op wash instructions given to pt with understanding verbalized    3/21/17@1850, Positive nose culture for MSSA received, Escribe sent to pt's pharmacy for mupirocin ointment. Spoke with pt and reinforced pt to use nasal ointment BID for 5 days ASAP. Pt verbalized understanding. (Ady Gao, RANJAN)
cont zoloft  stable
cont zoloft
cont zoloft  stable
pt s/p surgery weight bearing as tolerated.
cont zoloft

## 2017-04-06 NOTE — PROGRESS NOTE ADULT - PROBLEM SELECTOR PLAN 4
tanner pt has hcp  plan for dc to [facility] today after clearance from ortho service
stable   cont hh daily  cont feso4
stable   monitor CBC  continue iron supplements
tanner pt has hcp  plan for dc to [facility] on thursday after clearance from ortho service
stable   cont hh daily  cont feso4

## 2017-04-06 NOTE — PROGRESS NOTE ADULT - PROVIDER SPECIALTY LIST ADULT
Anesthesia
Hospitalist
Internal Medicine
Orthopedics
Anesthesia
Internal Medicine

## 2017-04-06 NOTE — PROGRESS NOTE ADULT - PROBLEM SELECTOR PROBLEM 3
Anemia due to blood loss
Anemia due to blood loss
Hypotension, unspecified hypotension type

## 2017-04-06 NOTE — PROGRESS NOTE ADULT - PROBLEM SELECTOR PLAN 5
s/p bl tkr  continue physical therapy  pain management  for dc to [facility] today
s/p bl tkr  continue physical therapy  pain management  for dc to [facility] on thursday
tanner pt has hcp

## 2017-04-06 NOTE — PROGRESS NOTE ADULT - SUBJECTIVE AND OBJECTIVE BOX
ROSALVA WERNER 76y  MRN-[id number]       ORTHOPEDIC SURGERY / DR. RUIZ    POD # 3   S/P  LEFT TKR  POD # 10 S/P  RIGHT TKR      BILATERAL  KNEES :    WOUND DRY AND INTACT  SOME EDEMA UNCHANGED   GOOD MOTOR TO BILATERAL  LOWER EXTREMITIES   NEURO-VASCULAR STATUS INTACT  NO CALF TENDERNESS      Hemoglobin: 9.5 (04-05 @ 09:09)  Hemoglobin: 9.7 (04-04 @ 09:01)  Hemoglobin: 10.3 (04-03 @ 16:42)    Hematocrit: 29.1 (04-05 @ 09:09)  Hematocrit: 29.0 (04-04 @ 09:01)  Hematocrit: 32.6 (04-03 @ 16:42)    04-04    140  |  104  |  15  ----------------------------<  97  4.3   |  28  |  0.81    Ca    8.6      04 Apr 2017 09:01          ASSESSMENT &  PLAN:  S/P  STAGED BILATERAL  TOTAL KNEE  REPLACEMENT    WEIGHT  BEARING AS TOLERATED, OOB AND AMBULATE, PT,  DVT PROPHYLAXIS  MG PO BID  INCENTIVE SPIROMETRY   DISCHARGE PLANNING  REHAB TODAY  NEW AQUACEL DRESSING APPLIED

## 2017-04-10 DIAGNOSIS — F41.9 ANXIETY DISORDER, UNSPECIFIED: ICD-10-CM

## 2017-04-10 DIAGNOSIS — H40.9 UNSPECIFIED GLAUCOMA: ICD-10-CM

## 2017-04-10 DIAGNOSIS — D62 ACUTE POSTHEMORRHAGIC ANEMIA: ICD-10-CM

## 2017-04-10 DIAGNOSIS — Z87.891 PERSONAL HISTORY OF NICOTINE DEPENDENCE: ICD-10-CM

## 2017-04-10 DIAGNOSIS — Z22.321 CARRIER OR SUSPECTED CARRIER OF METHICILLIN SUSCEPTIBLE STAPHYLOCOCCUS AUREUS: ICD-10-CM

## 2017-04-10 DIAGNOSIS — I10 ESSENTIAL (PRIMARY) HYPERTENSION: ICD-10-CM

## 2017-04-10 DIAGNOSIS — F32.9 MAJOR DEPRESSIVE DISORDER, SINGLE EPISODE, UNSPECIFIED: ICD-10-CM

## 2017-04-10 DIAGNOSIS — M17.0 BILATERAL PRIMARY OSTEOARTHRITIS OF KNEE: ICD-10-CM

## 2017-04-10 DIAGNOSIS — I95.2 HYPOTENSION DUE TO DRUGS: ICD-10-CM

## 2017-04-10 DIAGNOSIS — M79.661 PAIN IN RIGHT LOWER LEG: ICD-10-CM

## 2017-04-10 DIAGNOSIS — Z79.82 LONG TERM (CURRENT) USE OF ASPIRIN: ICD-10-CM

## 2017-04-10 DIAGNOSIS — T40.2X5A ADVERSE EFFECT OF OTHER OPIOIDS, INITIAL ENCOUNTER: ICD-10-CM

## 2017-04-10 DIAGNOSIS — E78.5 HYPERLIPIDEMIA, UNSPECIFIED: ICD-10-CM

## 2020-08-21 NOTE — DISCHARGE NOTE ADULT - HOSPITAL COURSE
Provider Note


Provider Note


Anesthesiology





Called to place a central line in Covid+ pt. requiring IV access.  Right IJ TLC 

placed in usual sterile fashion and under US guidance.  Cath. sutured in place 

and sterile dressing applied.  CXR pending to confirm placement.  Pt. tolerated 

procedure well.





Zachery Jamison MD





Justicifation of Admission Dx:


Justifications for Admission:


Justification of Admission Dx:  Yes











RIC JAMISON MD           Aug 21, 2020 17:40 THIS IS A CASE OF A 77 YO FEMALE EVALUATED IN THE OFFICE DUE TO BILATERAL  KNEE PAIN.    PAST MEDICAL HISTORY: HTN, ANXIETY, DEPRESSION, GLAUCOMA OA     HOSPITAL COURSE: AFTER THE RISK AND BENEFITS OF SURGICAL INTERVENTION IN DETAILS WERE DISCUSSED WITH THE PATIENT, A CONSENT WAS OBTAINED. AFTER OBTAINING MEDICAL CLEARANCE AND PREOPERATIVE EVALUATION, THE PATIENT WAS TAKEN TO THE OPERATING ROOM ON 03/27/2017 AND THE PATIENT UNDERWENT A  RIGHT TOTAL KNEE REPLACEMENT. POSTOPERATIVE PHASE, THE PATIENT WAS ANTICOAGULATED WITH LOVENOX ONLY  AND WAS GIVEN 24 HOURS OF IV ANTIBIOTICS. A SOCIAL SERVICE CONSULT WAS OBTAINED FOR DISCHARGE PLANNING. A PHYSICAL THERAPY CONSULT WAS OBTAINED FOR  WEIGHT BEARING AS TOLERATED.   DUE TO ANEMIA OF ACUTE BLOOD LOSS POST OP  IRON SUPPLEMENT GIVEN.     DUE TO EDEMA TO RIGHT LEG WITH CALF PAIN A DOPPLER DONE AND WAS NEGATIVE FOR DVT.     ON 04/03/2017  THE PATIENT UNDERWENT A  LEFT  TOTAL KNEE REPLACEMENT. POSTOPERATIVE PHASE, THE PATIENT WAS ANTICOAGULATED WITH ASPIRIN 325 MG BID   AND WAS GIVEN 24 HOURS OF IV ANTIBIOTICS. A SOCIAL SERVICE CONSULT WAS OBTAINED FOR DISCHARGE PLANNING. A PHYSICAL THERAPY CONSULT WAS OBTAINED FOR  WEIGHT BEARING AS TOLERATED.   DUE TO ANEMIA OF ACUTE BLOOD LOSS POST OP  IRON SUPPLEMENT GIVEN.     DISPOSITION : REHAB AND FOLLOW UP WITH DR. RUIZ AS OUTPATIENT.

## 2024-06-05 PROBLEM — I10 ESSENTIAL (PRIMARY) HYPERTENSION: Chronic | Status: ACTIVE | Noted: 2017-03-20

## 2024-06-05 PROBLEM — M54.5 LOW BACK PAIN: Chronic | Status: ACTIVE | Noted: 2017-03-20

## 2024-06-05 PROBLEM — M54.31 SCIATICA, RIGHT SIDE: Chronic | Status: ACTIVE | Noted: 2017-03-20

## 2024-06-05 PROBLEM — M51.26 OTHER INTERVERTEBRAL DISC DISPLACEMENT, LUMBAR REGION: Chronic | Status: ACTIVE | Noted: 2017-03-20

## 2024-06-05 PROBLEM — F41.9 ANXIETY DISORDER, UNSPECIFIED: Chronic | Status: ACTIVE | Noted: 2017-03-20

## 2024-06-05 PROBLEM — M17.11 UNILATERAL PRIMARY OSTEOARTHRITIS, RIGHT KNEE: Chronic | Status: ACTIVE | Noted: 2017-03-20

## 2024-06-05 PROBLEM — H40.9 UNSPECIFIED GLAUCOMA: Chronic | Status: ACTIVE | Noted: 2017-03-20

## 2024-06-19 ENCOUNTER — APPOINTMENT (OUTPATIENT)
Dept: ORTHOPEDIC SURGERY | Facility: CLINIC | Age: 84
End: 2024-06-19

## 2024-08-13 NOTE — PROGRESS NOTE ADULT - PROBLEM SELECTOR PLAN 2
"Diabetes Care Specialist Progress Note  Author: Elena Villatoro RD, CDE  Date: 8/13/2024    Intake    Program Intake  Reason for Diabetes Program Visit:: Initial Diabetes Assessment  Current diabetes risk level:: moderate  In the last 12 months, have you:: been admitted to a hospital  Was the ER or hospital admission related to diabetes?: No  Permission to speak with others about care:: yes (Pt's dtr - Esha - attended appt with her - pt lives with her and Esha assists with SMBG, med administration, meal prep, and ADLs)    Current Diabetes Treatment: Insulin  Method of insulin delivery?: Injections  Injection Type: Pens  Pen Type/Dose: Levemir 20 units in am and 24 units in pm and Humalog 11 units + scale AC (uses scale 1:25 starting at 150)    Continuous Glucose Monitoring  Patient has CGM: No    Lab Results   Component Value Date    HGBA1C 8.2 (H) 07/25/2024             Lifestyle Coping Support & Clinical    Lifestyle/Coping/Support  Does anyone in your family have diabetes or does anyone in your family support you in your diabetes care?: Dtr Esha is her primary support - has complicated relationship with other 2 dtrs who live in [place]  List anything about Diabetes that causes you stress?: concerned about high readings during the day and "low" readings at night - has not had a true low at night but sometimes has a reading <100 HS, lowest was 84  Learning Barriers:: None  Culture or Zoroastrian beliefs that may impact ability to access healthcare: No  Psychosocial/Coping Skills Assessment Completed: : No  Deffered due to:: Time  Area of need?: Deferred    Problem Review  Active Comorbidities: Neuropathy, Chronic Kidney Disease, Other (comment), Cardiovascular Disease (COPD, on portable oxygen)    Diabetes Self-Management Skills Assessment    Medication Skills Assessment  Patient is able to identify current diabetes medications, dosages, and appropriate timing of medications.: yes  Patient reports problems or " concerns with current medication regimen.: yes  Medication regimen problems/concerns:: does not feel like regimen is working  Patient is  aware that some diabetes medications can cause low blood sugar?: Yes  Medication Skills Assessment Completed:: Yes  Assessment indicates:: Instruction Needed  Area of need?: Yes    Diabetes Disease Process/Treatment Options  Diabetes Type?: Type II  When were you diagnosed?: years ago  If previous diabetes education, when/where:: last seen by diabetes education in 2021  What are your goals for this education session?: to learn more about glucose management and would like help getting CGM  Is patient aware of what causes diabetes?: Yes  Does patient understand the pathophysiology of diabetes?: Yes  Diabetes Disease Process/Treatment Options: Skills Assessment Completed: Yes  Assessment indicates:: Adequate understanding  Area of need?: No    Nutrition/Healthy Eating  Meal Plan 24 Hour Recall - Breakfast: cream of wheat with butter (specifically states will not change this), 1/2 cup 2% milk, coffee with SF creamer and splenda  Meal Plan 24 Hour Recall - Lunch: ham and cheese sandwich on keto bread, 1/2 cup milk  Meal Plan 24 Hour Recall - Dinner: Gumbo (with tomatoes and okra), no rice  Meal Plan 24 Hour Recall - Snack: blueberries, spoon of peanut butter  Meal Plan 24 Hour Recall - Beverage: water - avoids sugary drinks  Who shops/cooks?: dtr  Patient can identify foods that impact blood sugar.: yes  Nutrition/Healthy Eating Skills Assessment Completed:: Yes  Assessment indicates:: Instruction Needed  Area of need?: Yes    Physical Activity/Exercise  Physical Activity/Exercise Skills Assessment Completed: : No  Deffered due to:: Time  Area of need?: Deferred    Home Blood Glucose Monitoring  Patient states that blood sugar is checked at home daily.: yes  Monitoring Method:: home glucometer  Fasting BG range history:: logs attached  How often do you check your blood sugar?: 4-6  times daily  What is your A1c Target?: <8%  Home Blood Glucose Monitoring Skills Assessment Completed: : Yes  Assessment indicates:: Instruction Needed  Area of need?: Yes    Acute Complications  Have you ever had hypoglycemia (low BG 70 or less)?: yes  How often and what are your symptoms?: not recently but had more lows in past with mixed insulin, gets light headed, weak  How do you treat hypoglycemia?: glucerna  Have you ever had hyperglycemia (high  or more)?: yes  How often and what are your symptoms?: multiple times per week - increased urination, more tired  How do you treat hyperglycemia? : taking insulin with correction scale  Have you ever had DKA?: yes  When:: June 2024  Do you ever test for ketones?: no  Acute Complications Skills Assessment Completed: : Yes  Assessment indicates:: Instruction Needed  Area of need?: Yes    Chronic Complications  Chronic Complications Skills Assessment Completed: : No  Deferred due to:: Time  Area of need?: Deferred      Assessment Summary and Plan    Based on today's diabetes care assessment, the following areas of need were identified:          8/6/2024    12:01 AM   Areas of Need   Medications/Current Diabetes Treatment Yes - see care planning   Lifestyle Coping Support Deferred   Diabetes Disease Process/Treatment Options No   Nutrition/Healthy Eating Yes - see care planning   Physical Activity/Exercise Deferred   Home Blood Glucose Monitoring Yes - see care planning   Acute Complications Yes -   Ed on hypoglycemia:  What is considered a low BG  Signs and symptoms  How to treat w/ 15/15 rule   Discussed appropriate treatment options: juice, soda, glucose tabs   When to call clinic for medication adjustment r/t frequent hypoglycemia   Pt has goal to carry fast acting sugar source in vehicle for emergencies    Ed on hyperglycemia:   What is considered dangerously high   Signs and symptoms discussed   Discussed increased risk r/t infection, stress, missed  medications   Ed on when to call doctor for medication adjustments     Chronic Complications Deferred       Today's interventions were provided through individual discussion, instruction, and written materials were provided.      Patient verbalized understanding of instruction and written materials.  Pt was able to return back demonstration of instructions today. Patient understood key points, needs reinforcement and further instruction.     Diabetes Self-Management Care Plan:    Today's Diabetes Self-Management Care Plan was developed with Indira's input. Indira has agreed to work toward the following goal(s) to improve his/her overall diabetes control.      Care Plan: Diabetes Management   Updates made since 7/14/2024 12:00 AM        Problem: Medications         Goal: Patient Agrees to take Diabetes Medication(s) MDI as prescribed.    Start Date: 8/6/2024   Expected End Date: 9/12/2024   Priority: High   Barriers: No Barriers Identified   Note:    8/6/24 - Dtr (Esha) is assisting with pt's insulin administration. Confirms she does not miss a dose and is consistently taking at appropriate times. Observed injection sites (prefers abdomen - less painful) - some bruising but minimal and does not appear to have much scar tissue. Appropriately rotating sites. She is taking more than currently prescribed - taking Levemir 20 in am and 24 in pm and taking Humalog 11 AC + scale starting at 150-200 +2 units. Overall, glucose remaining well above goal (see attached logs). Educated on insulin titration guidelines and dtr showed good understanding. She has not seen endocrinology since late last year - lost to follow-up - Pt is interested in getting back in with Giuliana. Sent message to [facility] Montero NP staff to assist with scheduling.       Task: Reviewed with patient all current diabetes medications and provided basic review of the purpose, dosage, frequency, side effects, and storage of both oral and injectable diabetes  medications. Completed 8/13/2024        Task: Discussed guidelines for preventing, detecting and treating hypoglycemia and hyperglycemia and reviewed the importance of meal and medication timing with diabetes mediations for prevention of hypoglycemia and maximum drug benefit. Completed 8/13/2024        Problem: Blood Glucose Self-Monitoring         Goal: Pt will check affordability of personal CGM.    Start Date: 8/6/2024   Expected End Date: 9/12/2024   Priority: Medium   Barriers: No Barriers Identified   Note:    8/6/24 - Pt is interested in personal CGM but has not been able to get it. Suspect it is d/t insurance - she has traditional Medicare A & B and [place] Medicaid, which requires going through DME. Explained process with pt and dtr and what to expect. Emphasized she will receive a call from DME regarding the order and must answer/call back before they will ship to her home. Dr. Zapata signed order for Dexcom G7 and sent to [facility].        Task: Provided patient with a meter today and sent Rx request to provider to send to patients pharmacy.         Task: Reviewed the importance of self-monitoring blood glucose and keeping logs. Completed 8/13/2024        Task: Instructed on how to self-monitor blood glucose using a home glucometer, how to properly dispose of used strips and lancets after use, and how to appropriately store meter and supplies.         Task: Provided patient with blood glucose logs, reviewed appropriate timing and frequency to SMBG, education on parameters on when to notify provider and advised patient to bring logs to all appts with PCP/Endocrinologist/Diabetes Care Specialist.         Task: Discussed ways to minimize pain when monitoring blood glucose.         Problem: Healthy Eating         Goal: Will add protein with breakfast.    Start Date: 8/6/2024   Expected End Date: 9/12/2024   Priority: Low   Barriers: No Barriers Identified   Note:    8/6/24 - Dtr verbalized has done a lot of her  own research on dm diet and received a lot of information from dietitian at rehab facility. She reports looking at glycemic index of foods and trying to really limit carbs. Pt does eat cream of wheat every morning - loves her cream of wheat and this one is important to her. Lunch is usually a sandwich on keto bread. Dinner is lightest carb meal - usually protein and non-starchy vegetables only. Does have some lower GI fruit for a snack and sometimes a spoonful of pb. Discussed does not need to avoid carbs all together but rather focus on portion sizes, choosing high fiber carbs, and balancing meals with protein. Encouraged adding a protein source to breakfast - likes eggs and willing to add to breakfast. Also discussed having lower HS readings because of very low carb dinner - encouraged adding a high fiber carb to dinner (beans, whole grain, berries, etc). Encouraged if BG <100 HS, to have carb + protein snack before bed. She likes 1 gopal cracker plank + pb for an HS snack.        Task: Reviewed the sources and role of Carbohydrate, Protein, and Fat and how each nutrient impacts blood sugar. Completed 8/13/2024        Task: Provided visual examples using dry measuring cups, food models, and other familiar objects such as computer mouse, deck or cards, tennis ball etc. to help with visualization of portions. Completed 8/13/2024        Task: Explained how to count carbohydrates using the food label and the use of dry measuring cups for accurate carb counting.         Task: Discussed strategies for choosing healthier menu options when dining out.         Task: Recommended replacing beverages containing high sugar content with noncaloric/sugar free options and/or water.         Task: Review the importance of balancing carbohydrates with each meal using portion control techniques to count servings of carbohydrate and label reading to identify serving size and amount of total carbs per serving. Completed 8/13/2024         Task: Provided Sample plate method and reviewed the use of the plate to estimate amounts of carbohydrate per meal.           Follow Up Plan     Follow up in about 1 month (around 9/6/2024) for 1 month follow-up.    Today's care plan and follow up schedule was discussed with patient.  Indira verbalized understanding of the care plan, goals, and agrees to follow up plan.        The patient was encouraged to communicate with his/her health care provider/physician and care team regarding his/her condition(s) and treatment.  I provided the patient with my contact information today and encouraged to contact me via phone or [facility] Patient Portal as needed.     Length of Visit   Total Time: 75 Minutes      hx of essential htn   bp well controlled now likely lower due to pain meds

## 2025-03-19 NOTE — PATIENT PROFILE ADULT. - PHONE #
[No Acute Distress] : no acute distress [Well Nourished] : well nourished [Well Developed] : well developed [Normal Appearance] : was normal in appearance [Neck Supple] : was supple [No Respiratory Distress] : no respiratory distress  [Clear to Auscultation] : lungs were clear to auscultation bilaterally [Normal Rate] : normal rate  [Regular Rhythm] : with a regular rhythm [Normal S1, S2] : normal S1 and S2 [No Murmur] : no murmur heard [Soft] : abdomen soft [Non Tender] : non-tender [Alert and Oriented x3] : oriented to person, place, and time [de-identified] : no tenderness to palpation to anterior chest  [phone]